# Patient Record
Sex: MALE | Race: BLACK OR AFRICAN AMERICAN | NOT HISPANIC OR LATINO | Employment: FULL TIME | URBAN - METROPOLITAN AREA
[De-identification: names, ages, dates, MRNs, and addresses within clinical notes are randomized per-mention and may not be internally consistent; named-entity substitution may affect disease eponyms.]

---

## 2020-01-14 ENCOUNTER — OFFICE VISIT (OUTPATIENT)
Dept: FAMILY MEDICINE CLINIC | Facility: CLINIC | Age: 50
End: 2020-01-14

## 2020-01-14 VITALS
HEIGHT: 67 IN | TEMPERATURE: 98 F | HEART RATE: 60 BPM | BODY MASS INDEX: 19.93 KG/M2 | OXYGEN SATURATION: 98 % | SYSTOLIC BLOOD PRESSURE: 122 MMHG | DIASTOLIC BLOOD PRESSURE: 80 MMHG | WEIGHT: 127 LBS

## 2020-01-14 DIAGNOSIS — J06.9 VIRAL UPPER RESPIRATORY TRACT INFECTION: Primary | ICD-10-CM

## 2020-01-14 PROCEDURE — 99213 OFFICE O/P EST LOW 20 MIN: CPT | Performed by: INTERNAL MEDICINE

## 2020-01-14 NOTE — PROGRESS NOTES
Subjective   Skyler Huerta is a 49 y.o. male.     Chief Complaint   Patient presents with   • Cough   • URI       History of Present Illness   2-3 weeks of cough with some phlegm and tight chest with no wheezing, short of breath, fever, chills, rashes, N, V or other issues. [facility] and tested negative for the flu.  Given cough med and prednisone.  Is some better but not completely.    The following portions of the patient's history were reviewed and updated as appropriate: allergies, current medications, past family history, past medical history, past social history, past surgical history and problem list.    Depression Screen:  No flowsheet data found.    Past Medical History:   Diagnosis Date   • Acute frontal sinusitis        History reviewed. No pertinent surgical history.    Family History   Problem Relation Age of Onset   • No Known Problems Mother    • No Known Problems Father        Social History     Socioeconomic History   • Marital status:      Spouse name: Not on file   • Number of children: Not on file   • Years of education: Not on file   • Highest education level: Not on file   Tobacco Use   • Smoking status: Never Smoker   • Smokeless tobacco: Never Used   Substance and Sexual Activity   • Alcohol use: Not Currently   • Drug use: Never   • Sexual activity: Defer       Current Outpatient Medications   Medication Sig Dispense Refill   • brompheniramine-pseudoephedrine-DM 30-2-10 MG/5ML syrup 10mL po q4-6h prn cough/congestion 180 mL 0     No current facility-administered medications for this visit.        Review of Systems   Constitutional: Negative for activity change, appetite change, fatigue, fever, unexpected weight gain and unexpected weight loss.   HENT: Negative for nosebleeds, rhinorrhea, trouble swallowing and voice change.    Eyes: Negative for visual disturbance.   Respiratory: Positive for cough. Negative for chest tightness, shortness of breath and  "wheezing.    Cardiovascular: Negative for chest pain, palpitations and leg swelling.   Gastrointestinal: Negative for abdominal pain, blood in stool, constipation, diarrhea, nausea, vomiting, GERD and indigestion.   Genitourinary: Negative for dysuria, frequency and hematuria.   Musculoskeletal: Negative for arthralgias, back pain and myalgias.   Skin: Negative for rash and bruise.   Neurological: Negative for dizziness, tremors, weakness, light-headedness, numbness, headache and memory problem.   Hematological: Negative for adenopathy. Does not bruise/bleed easily.   Psychiatric/Behavioral: Negative for sleep disturbance and depressed mood. The patient is not nervous/anxious.        Objective   /80 (BP Location: Left arm, Patient Position: Sitting, Cuff Size: Adult)   Pulse 60   Temp 98 °F (36.7 °C)   Ht 170.2 cm (67.01\")   Wt 57.6 kg (127 lb)   SpO2 98%   BMI 19.89 kg/m²     Physical Exam   Constitutional: He is oriented to person, place, and time. He appears well-developed and well-nourished. No distress.   HENT:   Head: Normocephalic and atraumatic.   Right Ear: External ear normal.   Left Ear: External ear normal.   Nose: Nose normal.   Mouth/Throat: Oropharynx is clear and moist.   Eyes: Pupils are equal, round, and reactive to light. Conjunctivae and EOM are normal.   Neck: Normal range of motion. Neck supple. No tracheal deviation present. No thyromegaly present.   Cardiovascular: Normal rate, regular rhythm, normal heart sounds and intact distal pulses. Exam reveals no gallop and no friction rub.   No murmur heard.  Pulmonary/Chest: Effort normal and breath sounds normal. No respiratory distress.   Abdominal: Soft. Bowel sounds are normal. He exhibits no mass. There is no tenderness. There is no guarding.   Musculoskeletal: Normal range of motion. He exhibits no edema.   Lymphadenopathy:     He has no cervical adenopathy.   Neurological: He is alert and oriented to person, place, and time. He " displays normal reflexes. He exhibits normal muscle tone.   Skin: Skin is warm and dry. Capillary refill takes less than 2 seconds. No rash noted. He is not diaphoretic.   Psychiatric: He has a normal mood and affect. His behavior is normal. Judgment and thought content normal.   Nursing note and vitals reviewed.      Recent Results (from the past 2016 hour(s))   POCT Influenza A/B    Collection Time: 12/27/19  9:38 AM   Result Value Ref Range    Rapid Influenza A Ag Negative Negative    Rapid Influenza B Ag Negative Negative    Internal Control Passed Passed    Lot Number [id number]     Expiration Date 06/30/2021      Assessment/Plan   Skyler was seen today for cough and uri.    Diagnoses and all orders for this visit:    Viral upper respiratory tract infection    Reviewed history discussed with patient.  He appears to have residual cough is postviral symptom.  Symmetric only at this time no antibiotics or other treatment is necessary.  Patient is to schedule for a annual physical in approximately 3 months at which time we will need to be scheduled for his colonoscopy and blood work.

## 2020-06-10 ENCOUNTER — OFFICE VISIT (OUTPATIENT)
Dept: FAMILY MEDICINE CLINIC | Facility: CLINIC | Age: 50
End: 2020-06-10

## 2020-06-10 VITALS
SYSTOLIC BLOOD PRESSURE: 132 MMHG | BODY MASS INDEX: 19.78 KG/M2 | OXYGEN SATURATION: 99 % | HEART RATE: 66 BPM | WEIGHT: 126 LBS | TEMPERATURE: 98.6 F | HEIGHT: 67 IN | DIASTOLIC BLOOD PRESSURE: 88 MMHG

## 2020-06-10 DIAGNOSIS — Z12.5 ENCOUNTER FOR SPECIAL SCREENING EXAMINATION FOR NEOPLASM OF PROSTATE: ICD-10-CM

## 2020-06-10 DIAGNOSIS — Z00.00 ANNUAL PHYSICAL EXAM: Primary | ICD-10-CM

## 2020-06-10 DIAGNOSIS — Z12.11 SCREENING FOR COLON CANCER: ICD-10-CM

## 2020-06-10 PROBLEM — N32.81 OAB (OVERACTIVE BLADDER): Status: ACTIVE | Noted: 2020-06-10

## 2020-06-10 PROBLEM — J06.9 URI (UPPER RESPIRATORY INFECTION): Status: RESOLVED | Noted: 2020-01-14 | Resolved: 2020-06-10

## 2020-06-10 PROCEDURE — 99396 PREV VISIT EST AGE 40-64: CPT | Performed by: INTERNAL MEDICINE

## 2020-06-10 NOTE — PROGRESS NOTES
Chief Complaint   Patient presents with   • Annual Exam   • latex allergy       HPI:  Skyler Huerta, -1970, is a 50 y.o. male who presents for an annual physical.    Recent Hospitalizations:  No hospitalization(s) within the last year..    Current Medical Providers:  Patient Care Team:  Provider, No Known as PCP - General    Compared to one year ago, the patient feels his physical health is the same and his mental health is the same.    Depression Screen:  PHQ-2/PHQ-9 Depression Screening 6/10/2020   Little interest or pleasure in doing things 0   Feeling down, depressed, or hopeless 0   Trouble falling or staying asleep, or sleeping too much 0   Feeling tired or having little energy 0   Poor appetite or overeating 0   Feeling bad about yourself - or that you are a failure or have let yourself or your family down 0   Trouble concentrating on things, such as reading the newspaper or watching television 0   Moving or speaking so slowly that other people could have noticed. Or the opposite - being so fidgety or restless that you have been moving around a lot more than usual 0   Thoughts that you would be better off dead, or of hurting yourself in some way 0   Total Score 0       Past Medical/Family/Social History:  The following portions of the patient's history were reviewed and updated as appropriate: allergies, current medications, past family history, past medical history, past social history, past surgical history and problem list.    No Known Allergies      Current Outpatient Medications:   •  brompheniramine-pseudoephedrine-DM 30-2-10 MG/5ML syrup, 10mL po q4-6h prn cough/congestion, Disp: 180 mL, Rfl: 0    Current medication list contains no high risk medications.  No harmful drug interactions have been identified.     Family History   Problem Relation Age of Onset   • No Known Problems Mother    • No Known Problems Father        Social History     Tobacco Use   • Smoking status: Never Smoker   •  "Smokeless tobacco: Never Used   Substance Use Topics   • Alcohol use: Not Currently       History reviewed. No pertinent surgical history.    Patient Active Problem List   Diagnosis   • Annual physical exam   • Screening for colon cancer   • Encounter for special screening examination for neoplasm of prostate   • OAB (overactive bladder)       Review of Systems   Constitutional: Negative for activity change, appetite change, fatigue, fever, unexpected weight gain and unexpected weight loss.   HENT: Negative for nosebleeds, rhinorrhea, trouble swallowing and voice change.    Eyes: Negative for visual disturbance.   Respiratory: Negative for cough, chest tightness, shortness of breath and wheezing.    Cardiovascular: Negative for chest pain, palpitations and leg swelling.   Gastrointestinal: Negative for abdominal pain, blood in stool, constipation, diarrhea, nausea, vomiting, GERD and indigestion.   Genitourinary: Negative for dysuria, frequency and hematuria.   Musculoskeletal: Negative for arthralgias, back pain and myalgias.   Skin: Negative for rash and bruise.   Neurological: Negative for dizziness, tremors, weakness, light-headedness, numbness, headache and memory problem.   Hematological: Negative for adenopathy. Does not bruise/bleed easily.   Psychiatric/Behavioral: Negative for sleep disturbance and depressed mood. The patient is not nervous/anxious.        Objective     Vitals:    06/10/20 0914   BP: 132/88   BP Location: Left arm   Patient Position: Sitting   Cuff Size: Adult   Pulse: 66   Temp: 98.6 °F (37 °C)   TempSrc: Temporal   SpO2: 99%   Weight: 57.2 kg (126 lb)   Height: 170.2 cm (67.01\")       Patient's Body mass index is 19.73 kg/m². BMI is within normal parameters. No follow-up required..      No exam data present    Physical Exam   Constitutional: He is oriented to person, place, and time. He appears well-developed and well-nourished. No distress.   HENT:   Head: Normocephalic and atraumatic. "   Right Ear: External ear normal.   Left Ear: External ear normal.   Nose: Nose normal.   Mouth/Throat: Oropharynx is clear and moist.   Eyes: Pupils are equal, round, and reactive to light. Conjunctivae and EOM are normal.   Neck: Normal range of motion. Neck supple. No tracheal deviation present. No thyromegaly present.   Cardiovascular: Normal rate, regular rhythm, normal heart sounds and intact distal pulses. Exam reveals no gallop and no friction rub.   No murmur heard.  Pulmonary/Chest: Effort normal and breath sounds normal. No respiratory distress.   Abdominal: Soft. Bowel sounds are normal. He exhibits no mass. There is no tenderness. There is no guarding.   Musculoskeletal: Normal range of motion. He exhibits no edema.   Lymphadenopathy:     He has no cervical adenopathy.   Neurological: He is alert and oriented to person, place, and time. He displays normal reflexes. He exhibits normal muscle tone.   Skin: Skin is warm and dry. Capillary refill takes less than 2 seconds. No rash noted. He is not diaphoretic.   Psychiatric: He has a normal mood and affect. His behavior is normal. Judgment and thought content normal.   Nursing note and vitals reviewed.      Recent Lab Results:          Assessment/Plan   Age-appropriate Screening Schedule:  Refer to the list below for future screening recommendations based on patient's age, sex and/or medical conditions.      Health Maintenance   Topic Date Due   • TDAP/TD VACCINES (1 - Tdap) 03/09/1981   • COLONOSCOPY  01/06/2020   • ZOSTER VACCINE (1 of 2) 03/09/2020   • INFLUENZA VACCINE  08/01/2020       Diagnoses and all orders for this visit:    1. Annual physical exam (Primary)  -     Comprehensive Metabolic Panel  -     Lipid Panel    2. Screening for colon cancer  -     Ambulatory Referral For Screening Colonoscopy    3. Encounter for special screening examination for neoplasm of prostate  -     PSA Screen        Annual wellness visit reviewed with patient.  All  past history, medications, social history, and problem list were reviewed.  Discussed advanced directives and living will.  Patient has living will: Living will: no and information packet given to patient to complete at home and bring copy to office.  Will check the labs as ordered above to evaluate the blood sugars, kidney, liver, cholesterol for screening.  Discussed flu shot recommended to get the high-dose influenza vaccine annually in the fall.  Encouraged follow-up with the eye doctor on annual basis.  Discussed weight and encouraged exercise as tolerated while following a healthy diet.  Reviewed sexual health and safe sex practices.  Colon cancer screening discussed and current status: colonoscopy to be scheduled.  Discussed prostate screening.    An After Visit Summary with all of these plans were given to the patient.        Follow Up:  No follow-ups on file.

## 2020-06-11 LAB
ALBUMIN SERPL-MCNC: 5 G/DL (ref 3.5–5.2)
ALBUMIN/GLOB SERPL: 1.7 G/DL
ALP SERPL-CCNC: 64 U/L (ref 39–117)
ALT SERPL-CCNC: 27 U/L (ref 1–41)
AST SERPL-CCNC: 19 U/L (ref 1–40)
BILIRUB SERPL-MCNC: 0.4 MG/DL (ref 0.2–1.2)
BUN SERPL-MCNC: 9 MG/DL (ref 6–20)
BUN/CREAT SERPL: 7.8 (ref 7–25)
CALCIUM SERPL-MCNC: 9.7 MG/DL (ref 8.6–10.5)
CHLORIDE SERPL-SCNC: 106 MMOL/L (ref 98–107)
CHOLEST SERPL-MCNC: 192 MG/DL (ref 0–200)
CO2 SERPL-SCNC: 25.6 MMOL/L (ref 22–29)
CREAT SERPL-MCNC: 1.15 MG/DL (ref 0.76–1.27)
GLOBULIN SER CALC-MCNC: 2.9 GM/DL
GLUCOSE SERPL-MCNC: 87 MG/DL (ref 65–99)
HDLC SERPL-MCNC: 72 MG/DL (ref 40–60)
LDLC SERPL CALC-MCNC: 111 MG/DL (ref 0–100)
POTASSIUM SERPL-SCNC: 4.5 MMOL/L (ref 3.5–5.2)
PROT SERPL-MCNC: 7.9 G/DL (ref 6–8.5)
PSA SERPL-MCNC: 1.27 NG/ML (ref 0–4)
SODIUM SERPL-SCNC: 141 MMOL/L (ref 136–145)
TRIGL SERPL-MCNC: 44 MG/DL (ref 0–150)
VLDLC SERPL CALC-MCNC: 8.8 MG/DL

## 2020-06-24 ENCOUNTER — OFFICE VISIT (OUTPATIENT)
Dept: FAMILY MEDICINE CLINIC | Facility: CLINIC | Age: 50
End: 2020-06-24

## 2020-06-24 VITALS
SYSTOLIC BLOOD PRESSURE: 132 MMHG | OXYGEN SATURATION: 98 % | HEART RATE: 78 BPM | TEMPERATURE: 98.9 F | WEIGHT: 125 LBS | DIASTOLIC BLOOD PRESSURE: 88 MMHG | HEIGHT: 67 IN | BODY MASS INDEX: 19.62 KG/M2

## 2020-06-24 DIAGNOSIS — M67.432 GANGLION CYST OF WRIST, LEFT: ICD-10-CM

## 2020-06-24 DIAGNOSIS — L23.5 CHEMICAL INDUCED ALLERGIC CONTACT DERMATITIS: Primary | ICD-10-CM

## 2020-06-24 PROCEDURE — 99214 OFFICE O/P EST MOD 30 MIN: CPT | Performed by: INTERNAL MEDICINE

## 2020-06-24 NOTE — PROGRESS NOTES
Subjective   Skyler Huerta is a 50 y.o. male.     Chief Complaint   Patient presents with   • Allergies     ears   • knot on wrist       History of Present Illness   Complains of rash around the ears that occurs only after using his beats ear phones.  No other rashes or difficulty.  Switches to ear buds and no issues.  Rash lasts for a week.  States has 8-9 months of knot on the back side of the left wrist that is tender at times.  Gets bigger when uses the hands frequently     The following portions of the patient's history were reviewed and updated as appropriate: allergies, current medications, past family history, past medical history, past social history, past surgical history and problem list.    Depression Screen:  PHQ-2/PHQ-9 Depression Screening 6/10/2020   Little interest or pleasure in doing things 0   Feeling down, depressed, or hopeless 0   Trouble falling or staying asleep, or sleeping too much 0   Feeling tired or having little energy 0   Poor appetite or overeating 0   Feeling bad about yourself - or that you are a failure or have let yourself or your family down 0   Trouble concentrating on things, such as reading the newspaper or watching television 0   Moving or speaking so slowly that other people could have noticed. Or the opposite - being so fidgety or restless that you have been moving around a lot more than usual 0   Thoughts that you would be better off dead, or of hurting yourself in some way 0   Total Score 0       Past Medical History:   Diagnosis Date   • Acute frontal sinusitis        History reviewed. No pertinent surgical history.    Family History   Problem Relation Age of Onset   • No Known Problems Mother    • No Known Problems Father        Social History     Socioeconomic History   • Marital status:      Spouse name: Not on file   • Number of children: Not on file   • Years of education: Not on file   • Highest education level: Not on file   Tobacco Use   • Smoking  "status: Never Smoker   • Smokeless tobacco: Never Used   Substance and Sexual Activity   • Alcohol use: Not Currently   • Drug use: Never   • Sexual activity: Defer       No current outpatient medications on file.     No current facility-administered medications for this visit.        Review of Systems   Constitutional: Negative for activity change, appetite change, fatigue, fever, unexpected weight gain and unexpected weight loss.   HENT: Negative for congestion, nosebleeds, rhinorrhea, sore throat, trouble swallowing and voice change.    Eyes: Negative for pain and visual disturbance.   Respiratory: Negative for cough, chest tightness, shortness of breath and wheezing.    Cardiovascular: Negative for chest pain, palpitations and leg swelling.   Gastrointestinal: Negative for abdominal pain, blood in stool, constipation, diarrhea, nausea, vomiting, GERD and indigestion.   Genitourinary: Negative for dysuria, frequency and hematuria.   Musculoskeletal: Negative for arthralgias, back pain and myalgias.        Back of left hand with a small knot that is tender times   Skin: Positive for rash. Negative for bruise.   Neurological: Negative for dizziness, tremors, weakness, light-headedness, numbness, headache and memory problem.   Hematological: Negative for adenopathy. Does not bruise/bleed easily.   Psychiatric/Behavioral: Negative for sleep disturbance and depressed mood. The patient is not nervous/anxious.        Objective   /88 (BP Location: Left arm, Patient Position: Sitting, Cuff Size: Adult)   Pulse 78   Temp 98.9 °F (37.2 °C) (Temporal)   Ht 170.2 cm (67.01\")   Wt 56.7 kg (125 lb)   SpO2 98%   BMI 19.57 kg/m²     Physical Exam   Constitutional: He is oriented to person, place, and time. He appears well-developed and well-nourished. No distress.   HENT:   Head: Normocephalic and atraumatic.   Right Ear: External ear normal.   Left Ear: External ear normal.   Nose: Nose normal.   Mouth/Throat: " Oropharynx is clear and moist.   Eyes: Pupils are equal, round, and reactive to light. Conjunctivae and EOM are normal.   Neck: Normal range of motion. Neck supple. No tracheal deviation present. No thyromegaly present.   Cardiovascular: Normal rate, regular rhythm, normal heart sounds and intact distal pulses. Exam reveals no gallop and no friction rub.   No murmur heard.  Pulmonary/Chest: Effort normal and breath sounds normal. No respiratory distress.   Abdominal: Soft. Bowel sounds are normal. He exhibits no mass. There is no tenderness. There is no guarding.   Musculoskeletal: Normal range of motion. He exhibits no edema.   Mildly tender ganglion cyst on the dorsum of the left wrist without any overlying erythema.   Lymphadenopathy:     He has no cervical adenopathy.   Neurological: He is alert and oriented to person, place, and time. He displays normal reflexes. He exhibits normal muscle tone.   Skin: Skin is warm and dry. Capillary refill takes less than 2 seconds. No rash noted. He is not diaphoretic.   No rash currently around the ears.   Psychiatric: He has a normal mood and affect. His behavior is normal. Judgment and thought content normal.   Nursing note and vitals reviewed.      Recent Results (from the past 2016 hour(s))   Comprehensive Metabolic Panel    Collection Time: 06/10/20 10:04 AM   Result Value Ref Range    Glucose 87 65 - 99 mg/dL    BUN 9 6 - 20 mg/dL    Creatinine 1.15 0.76 - 1.27 mg/dL    eGFR Non African Am 67 >60 mL/min/1.73    eGFR African Am 82 >60 mL/min/1.73    BUN/Creatinine Ratio 7.8 7.0 - 25.0    Sodium 141 136 - 145 mmol/L    Potassium 4.5 3.5 - 5.2 mmol/L    Chloride 106 98 - 107 mmol/L    Total CO2 25.6 22.0 - 29.0 mmol/L    Calcium 9.7 8.6 - 10.5 mg/dL    Total Protein 7.9 6.0 - 8.5 g/dL    Albumin 5.00 3.50 - 5.20 g/dL    Globulin 2.9 gm/dL    A/G Ratio 1.7 g/dL    Total Bilirubin 0.4 0.2 - 1.2 mg/dL    Alkaline Phosphatase 64 39 - 117 U/L    AST (SGOT) 19 1 - 40 U/L    ALT  (SGPT) 27 1 - 41 U/L   Lipid Panel    Collection Time: 06/10/20 10:04 AM   Result Value Ref Range    Total Cholesterol 192 0 - 200 mg/dL    Triglycerides 44 0 - 150 mg/dL    HDL Cholesterol 72 (H) 40 - 60 mg/dL    VLDL Cholesterol 8.8 mg/dL    LDL Cholesterol  111 (H) 0 - 100 mg/dL   PSA Screen    Collection Time: 06/10/20 10:04 AM   Result Value Ref Range    PSA 1.270 0.000 - 4.000 ng/mL     Assessment/Plan   Skyler was seen today for allergies and knot on wrist.    Diagnoses and all orders for this visit:    Chemical induced allergic contact dermatitis    Encounter for special screening examination for neoplasm of prostate  -     Ambulatory Referral to Hand Surgery    Discussed with patient at length concerning his rash which I believe is due to a chemical dermatitis likely from either a diet or component of his earphones that he has been using.  If he has recurrent flares and he is use hydrocortisone and symptomatic treatment.  I did recommend he stop using those specific headphones that he has been having a problem with.  Discussed his ganglion cyst of the left wrist dorsum.  Patient wishes to have further evaluation and treatment.  Will refer to hand surgery for continued care.

## 2020-06-30 ENCOUNTER — PREP FOR SURGERY (OUTPATIENT)
Dept: OTHER | Facility: HOSPITAL | Age: 50
End: 2020-06-30

## 2020-06-30 DIAGNOSIS — Z12.11 ENCOUNTER FOR SCREENING FOR MALIGNANT NEOPLASM OF COLON: Primary | ICD-10-CM

## 2020-07-23 PROBLEM — Z12.11 ENCOUNTER FOR SCREENING FOR MALIGNANT NEOPLASM OF COLON: Status: ACTIVE | Noted: 2020-07-23

## 2021-03-30 ENCOUNTER — BULK ORDERING (OUTPATIENT)
Dept: CASE MANAGEMENT | Facility: OTHER | Age: 51
End: 2021-03-30

## 2021-03-30 DIAGNOSIS — Z23 IMMUNIZATION DUE: ICD-10-CM

## 2021-04-05 ENCOUNTER — IMMUNIZATION (OUTPATIENT)
Dept: VACCINE CLINIC | Facility: HOSPITAL | Age: 51
End: 2021-04-05

## 2021-04-05 DIAGNOSIS — Z23 IMMUNIZATION DUE: ICD-10-CM

## 2021-04-05 PROCEDURE — 0001A: CPT | Performed by: INTERNAL MEDICINE

## 2021-04-05 PROCEDURE — 91300 HC SARSCOV02 VAC 30MCG/0.3ML IM: CPT | Performed by: INTERNAL MEDICINE

## 2021-04-13 ENCOUNTER — TELEPHONE (OUTPATIENT)
Dept: GASTROENTEROLOGY | Facility: CLINIC | Age: 51
End: 2021-04-13

## 2021-04-13 NOTE — TELEPHONE ENCOUNTER
----- Message from Amber García sent at 4/12/2021 12:11 PM EDT -----  Regarding: Schedule scope  Contact: [phone]  PT needs to schedule scope

## 2021-04-26 ENCOUNTER — IMMUNIZATION (OUTPATIENT)
Dept: VACCINE CLINIC | Facility: HOSPITAL | Age: 51
End: 2021-04-26

## 2021-04-26 PROCEDURE — 91300 HC SARSCOV02 VAC 30MCG/0.3ML IM: CPT | Performed by: INTERNAL MEDICINE

## 2021-04-26 PROCEDURE — 0002A: CPT | Performed by: INTERNAL MEDICINE

## 2021-07-09 ENCOUNTER — OUTSIDE FACILITY SERVICE (OUTPATIENT)
Dept: GASTROENTEROLOGY | Facility: CLINIC | Age: 51
End: 2021-07-09

## 2021-07-09 PROCEDURE — 45385 COLONOSCOPY W/LESION REMOVAL: CPT | Performed by: INTERNAL MEDICINE

## 2021-07-28 ENCOUNTER — TELEPHONE (OUTPATIENT)
Dept: GASTROENTEROLOGY | Facility: CLINIC | Age: 51
End: 2021-07-28

## 2021-07-28 NOTE — TELEPHONE ENCOUNTER
----- Message from Kiley Craft DO sent at 1/27/2020  8:54 AM CST -----  Urine showed no signs of infection  Kiley Craft DO     ----- Message from Silvia Crook MD sent at 7/14/2021 12:19 PM EDT -----  The polyp(s) biopsies showed adenomatous change. This is not cancerous but is considered potentially precancerous. Follow-up colonoscopy in 5 years is advised.

## 2021-12-18 ENCOUNTER — IMMUNIZATION (OUTPATIENT)
Dept: VACCINE CLINIC | Facility: HOSPITAL | Age: 51
End: 2021-12-18

## 2021-12-18 PROCEDURE — 91300 HC SARSCOV02 VAC 30MCG/0.3ML IM: CPT | Performed by: INTERNAL MEDICINE

## 2021-12-18 PROCEDURE — 0004A HC ADM SARSCOV2 30MCG/0.3ML BOOSTER: CPT | Performed by: INTERNAL MEDICINE

## 2022-12-08 ENCOUNTER — E-VISIT (OUTPATIENT)
Dept: FAMILY MEDICINE CLINIC | Facility: TELEHEALTH | Age: 52
End: 2022-12-08
Payer: COMMERCIAL

## 2022-12-08 PROCEDURE — BRIGHTMDVISIT: Performed by: NURSE PRACTITIONER

## 2022-12-08 NOTE — E-VISIT TREATED
Chief Complaint: Coronavirus (COVID-19), cold, sinus pain, allergy, or flu   Patient introduction   Patient is 52-year-old male with cough, fever (which may have resolved; see below), nasal discharge, itchy or watery eyes, headache, sweats, chills, myalgia, and fatigue that started less than 48 hours ago. Regarding date of symptom onset, patient writes: [id number].   COVID-19 exposure, testing history, and vaccination status:    No known exposure to a person with a confirmed or suspected case of COVID-19.    No recent travel outside of their local community.    No history of COVID-19 testing since symptom onset.    Received 4 doses of the COVID-19 vaccine (Pfizer, Pfizer, Pfizer, Pfizer).   Received their most recent dose of the vaccine more than 14 days ago.   Patient requests a 2-day excuse note.   General presentation   Symptoms came on gradually.   Fever:    Has had less than 24 hours of measured fever.    Has current measured fever, but none at initial symptom onset.    Highest temperature of 101.6F to 101.9F.   Sinus and nasal symptoms:    Nasal discharge.    Clear nasal drainage.    Nasal drainage is thin.    Postnasal drip.    No nasal or sinus congestion.    No itchy nose or sneezing.   Throat symptoms:    No sore throat.   Head and body aches:    Headache described as mild (1 to 3 on a scale of 1 to 10).    Sweats.    Chills.    Myalgia.    Fatigue.   Cough:    Cough is worse in the morning.    Cough is mildly productive of sputum.    Describes color of sputum as clear.   Wheezing and shortness of breath:    No COPD diagnosis.    No asthma diagnosis.    No shortness of breath.    No previous albuterol inhaler use for URIs, bronchitis, or pneumonia.    No previous steroid inhaler use for URIs, bronchitis, or pneumonia.   Chest pain:    Has chest pain, but only when coughing.    Chest pain is not the patient's chief complaint.    [place] Heart Score (MHS): 0, low risk of CAD. Assigning 1 point to each of 5  criteria (female >= 65 years old or male >= 55 years, known CAD, pain worse with exercise, pain not reproducible with palpation, and patient assumes pain is cardiogenic), the MHS is a validated clinical decision rule used to rule out coronary artery disease in primary care patients with chest pain.   Ear symptoms:    None.   Dizziness:    Mild dizziness that does not interfere with daily activities.   Allergies:    No history of allergies.   Flu exposure:    No recent known exposure to a person with a confirmed flu diagnosis.    Has not had a flu vaccine this season.   Patient is taking over-the-counter medications for current symptoms, including acetaminophen, ibuprofen, and loratadine.   Review of red flags/alarm symptoms:    No changes in alertness or awareness.    No symptoms suggesting intracranial hemorrhage.    No fever above 100.4F lasting longer than 4 days.    No decreased urination.   Self-exam:    Height: 170 centimeters    Weight: 58.9 kilograms    No difficulty moving their chin toward their chest.    Neck lymph nodes feel normal.    No periorbital edema.    Has not taken antibiotics for similar symptoms within the past month.   Current medications   Not taking other medications or supplements.   Medication allergies   None.   Medication contraindication review   No history of anaphylactic reaction to beta-lactam antibiotics; aspirin triad; blood dyscrasia; bone marrow depression; catecholamine-releasing paraganglioma; coronary artery disease; coagulation disorder; congenital long QT syndrome; depression; electrolyte abnormalities; fungal infection; GI bleeding; GI obstruction; G6PD deficiency; heart arrhythmia; hypertension; mononucleosis; myasthenia; recent myocardial infarction; NSAID-induced asthma/urticaria; Parkinson's disease; pheochromocytoma; porphyria; Reye syndrome; seizure disorder; ulcerative colitis; and urinary retention.   No history of metoclopramide-associated dystonic reaction and  tardive dyskinesia.   No known history of amoxicillin-clavulanate-associated cholestatic jaundice or hepatic impairment.   No known history of azithromycin-associated cholestatic jaundice or hepatic impairment.   Past medical history   Immune conditions: No immunocompromising conditions. No history of cancer.   Social history   High-risk household contacts: Patient's household includes one or more persons with asthma.   Never smoked tobacco.   Assessment   Viral URI, cannot rule out influenza or COVID-19.   Plan   Medications:    benzonatate 200 mg capsule RX 200mg 1 cap PO tid PRN 7d for cough. Do not chew or cut these capsules. Amount is 21 cap.    Mucus Relief  mg tablet, extended release RX 600mg 1 tab PO q12h PRN 7d for cough and congestion. Amount is 14 tab.    ipratropium bromide 42 mcg (0.06 %) nasal spray RX 0.06% 2 sprays nasal tid PRN 4d for nasal symptoms. Amount is 15 mL.    oseltamivir 75 mg capsule RX 75mg 1 cap PO bid 5d for influenza (flu). Take it exactly as directed. You must finish the entire course of medication, even if you feel better after the first few days of treatment. Amount is 10 cap.    Theraflu Nighttime Severe Cold-Cough 25 mg-10 mg-650 mg powder packet OTC Phenylephrine hydrochloride 10mg/Diphenhydramine hydrochloride 25mg/Acetaminophen 650mg 1 packet PO q4h PRN 7d for cold symptoms. Consume entire drink within 10-15 minutes. Do not exceed 5 packets in any 24-hour period. May cause drowsiness. Do not drive or operate heavy machinery after taking this medication. Do not combine with other products that contain acetaminophen. Amount is 6 packet.    ibuprofen 200 mg tablet OTC 200mg 2 tabs PO q8h PRN 10d for any fever, pain, or discomfort associated with your condition. Do not exceed 3200mg in a 24-hour period. Amount is 60 tab.   The patient's prescriptions will be sent to:   [facility] [id number]   [address] [place], [place] [id number]   Phone: [phone]     Fax:  [phone]   Patient informed to purchase OTC medications.   Other:   Patient was given an excuse note for 2 days.   Education:    Condition and causes    Prevention    Treatment and self-care    When to call provider   ----------   Electronically signed by DM Davis on 2022-12-08 at 15:33PM   ----------   Patient Interview Transcript:   Please carefully consider each question and answer as best you can. This helps your provider give you the best care. Which of these symptoms are bothering you? Select all that apply.    Cough    Fever    Runny nose    Itchy or watery eyes    Headache    Sweats    Chills    Muscle or body aches    Fatigue or tiredness   Not selected:    Shortness of breath    Stuffed-up nose or sinuses    Itchy nose or sneezing    Loss of smell or taste    Sore throat    Hoarse voice or loss of voice    Nausea or vomiting    Diarrhea    I don't have any of these symptoms   Since your current symptoms started, have you been tested for COVID-19? Select one.    No   Not selected:    Yes   Have you gotten the COVID-19 vaccine? Select one.    Yes   Not selected:    No   How many total doses of the COVID-19 vaccine have you gotten? This includes boosters as well as additional doses for those who are immunocompromised. Select one.    4 doses   Not selected:    1 dose    2 doses    3 doses    5 doses   1st dose    Pfizer   Not selected:    J&J/Neris    Moderna    Novavax   2nd dose    Pfizer   Not selected:    J&J/Neris    Moderna    Novavax   3rd dose    Pfizer   Not selected:    J&J/Neris    Moderna    Novavax   4th dose    Pfizer   Not selected:    J&J/Neris    Moderna    Novavax   When did you get your most recent dose of the COVID-19 vaccine?    More than 14 days ago   Not selected:    Less than 48 hours (2 days) ago    48 to 72 hours (3 days) ago    3 to 5 days ago    5 to 7 days ago    7 to 14 days ago   In the last 14 days, have you traveled outside of your local community? This  "includes travel by car, RV, bus, train, or plane. Travel increases your chances of getting and spreading COVID-19. Select one.    No   Not selected:    Yes   In the last 14 days, have you had close contact with someone who has coronavirus (COVID-19)? \"Close contact\" means any of these: - Living in the same household as someone with COVID-19. - Caring for someone with COVID-19. - Being within 6 feet of someone with COVID-19 for a total of at least 15 minutes over a 24-hour period. For example, three 5-minute exposures for a total of 15 minutes. - Being in direct contact with respiratory droplets from someone with COVID-19 (being coughed on, kissing, sharing utensils). Select one.    No, not that I know of   Not selected:    Yes, a confirmed case    Yes, a suspected case   When did your current symptoms start? Select one.    Less than 48 hours ago   Not selected:    3 to 5 days ago    6 to 9 days ago    10 to 14 days ago    2 to 3 weeks ago    3 to 4 weeks ago    More than a month ago   Do you know the exact date your symptoms started? If so, enter the date as MM/DD/YY. Select one.    Yes (specify): [id number]   Not selected:    No   Did your symptoms come on suddenly or gradually? Select one.    Gradually   Not selected:    Suddenly    I'm not sure   You mentioned having a fever. Do you have a fever now? Select one.    Yes, but I didn't have one when my symptoms started   Not selected:    Yes, and I've had one since my symptoms started    No, it's gone now   Did you take your temperature with a thermometer? Select one.    Yes   Not selected:    No, but it felt mild    No, but it felt high   What was the highest reading on the thermometer? Select one.    101.6 to 101.9F   Not selected:    Below 100.4F    100.4 to 101.5F    102.0 to 103.0F    Above 103.0F   How long have you had a fever? Select one.    Less than 24 hours   Not selected:    1 to 3 days    4 or more days   You mentioned having a headache. On a scale of 1 to " "10, how severe is your headache pain? Select one.    Mild (1 to 3)   Not selected:    Moderate (4 to 6)    Severe (7 to 9)    Unbearable (10)    The worst headache of my life (10+)   Do you cough so hard that it's made you gag or vomit? By gag, we mean has your coughing made you choke or dry heave? Select all that apply.    No   Not selected:    Yes, my coughing has made me gag    Yes, my coughing has made me vomit   When is your cough the worst? Select all that apply.    In the morning, or when I wake up   Not selected:    During the day    At nighttime, or while I'm sleeping    I haven't noticed a difference depending on time of day   Are you coughing up mucus or phlegm? Select one.    Yes, a little   Not selected:    No, my cough is dry    Yes, a lot   What color is most of the mucus or phlegm that you're coughing up? Select one.    Clear   Not selected:    White/frothy    Yellow    Green    Red or pink    I'm not sure   What color is your nasal drainage? Select one.    Clear   Not selected:    White    Yellow    Green    My nose is stuffed but not draining or running    I'm not sure   Is your nasal drainage thick or thin? Select one.    Thin   Not selected:    Thick   Is there any drainage (mucus) going down the back of your throat? This kind of drainage is also called \"postnasal drip.\" Select one.    Yes   Not selected:    No, not that I know of   Since your symptoms started, have you felt dizzy? Select one.    Yes, but I can continue with my regular daily activities   Not selected:    Yes, and it makes it hard to stand, walk, or do daily activities    No   Do you have chest pain? You might also feel it as discomfort, aching, tightness, or squeezing in the chest. Select one.    Yes   Not selected:    No   Which of these is true of your chest pain? Select one.    My chest hurts only when I cough   Not selected:    My chest hurts even when I'm not coughing   Have you urinated at least 3 times in the last 24 " hours? Select one.    Yes   Not selected:    No   Changes in alertness or awareness may mean you need emergency care. Since your symptoms started, have you had any of these? Select all that apply.    None of the above   Not selected:    Confusion    Slurred speech    Not knowing where you are or what day it is    Difficulty staying conscious    Fainting or passing out   Do your symptoms include a whistling sound, or wheezing, when you breathe? Select one.    I'm not sure   Not selected:    Yes    No   Are your eyelids or the areas around your eyes puffy? Select one.    No   Not selected:    Yes, but I can easily open my eye(s)    Yes, and it's hard to open my eye(s)    Yes, and my eye(s) are completely swollen shut   Do you have any of these symptoms in your ear(s)? Select all that apply.    None of the above   Not selected:    Pain    Pressure    Fullness    Crackling or popping    Plugged or blocked sensation   Can you move your chin toward your chest?    Yes   Not selected:    No, my neck is too stiff   Are your glands/lymph nodes swollen, or does it hurt when you touch them?    No, not that I can tell   Not selected:    Yes   In the past week, has anyone around you (such as at school, work, or home) had a confirmed diagnosis of the flu? A confirmed diagnosis means that a nose swab was done to verify a flu infection. Select all that apply.    No, not that I know of   Not selected:    I live with someone who has the flu    I've been within touching distance of someone who has the flu    I've walked by, or sat about 3 feet away from, someone who has the flu    I've been in the same building as someone who has the flu   Have you ever been diagnosed with asthma? Select one.    No   Not selected:    Yes   Have you ever been prescribed albuterol to use for wheezing, cough, or shortness of breath caused by a cold, bronchitis, or pneumonia? Albuterol (ProAir, Proventil, Ventolin) is prescribed as an inhaler or a solution  to be used with a nebulizer machine. Select one.    No, not that I know of   Not selected:    Yes   Have you ever been prescribed a steroid inhaler to use for wheezing, cough, or shortness of breath caused by a cold, bronchitis, or pneumonia? Some examples of steroid inhalers include Pulmicort, Flovent, Qvar, and Alvesco. Select one.    No, not that I know of   Not selected:    Yes   Have you ever been diagnosed with chronic obstructive pulmonary disease (COPD)? Select one.    No, not that I know of   Not selected:    Yes   In the past month, have you taken antibiotics for similar symptoms? Examples of antibiotics include amoxicillin, amoxicillin-clavulanate (Augmentin), penicillin, cefdinir (Omnicef), doxycycline, and clindamycin (Cleocin). Select one.    No   Not selected:    Yes   Do you have allergies (pollen, dust mites, mold, animal dander)? Select one.    No, not that I know of   Not selected:    Yes   Have you had a flu shot this season? Select one.    No, not that I know of   Not selected:    Yes, less than 2 weeks ago    Yes, 2 to 4 weeks ago    Yes, 1 to 3 months ago    Yes, 3 to 6 months ago    Yes, more than 6 months ago   The flu and COVID-19 can be more serious for people with certain conditions or characteristics. These questions help us figure out if you or anyone you live with is at higher risk for complications from these infections. Do either of these statements apply to you? Select all that apply.    None of the above   Not selected:    I'm  or Native [place]    I'm a healthcare worker   Do you smoke tobacco? Select one.    No   Not selected:    Yes, every day    Yes, some days    No, I quit   Do you have any of these conditions? Select all that apply.    None of the above   Not selected:    Chronic lung disease, such as cystic fibrosis or interstitial fibrosis    Heart disease, such as congenital heart disease, congestive heart failure, or coronary artery disease    Disorder of  the brain, spinal cord, or nerves and muscles, such as dementia, cerebral palsy, epilepsy, muscular dystrophy, or developmental delay    Metabolic disorder or mitochondrial disease    Cerebrovascular disease, such as stroke or another condition affecting the blood vessels or blood supply to the brain    Down syndrome    Mood disorder, including depression or schizophrenia spectrum disorders    Substance use disorder, such as alcohol, opioid, or cocaine use disorder    Tuberculosis   Do you live in a group care setting? Examples include: - Nursing home - Residential care - Psychiatric treatment facility - [facility] - [facility] - [facility] and [facility] - Homeless shelter - Foster care setting Select one.    No   Not selected:    Yes   Are you a healthcare worker? Select one.    No   Not selected:    Yes   People with a very high body mass index (BMI) are at higher risk for developing complications from the flu and severe illness from COVID-19. To determine your BMI, we need to know your weight and height. Please enter your weight (in pounds).    Weight   Please enter your height.    Height   Do you have any of these conditions that can affect the immune system? Scroll to see all options. Select all that apply.    None of these   Not selected:    History of bone marrow transplant    Chronic kidney disease    Chronic liver disease (including cirrhosis)    HIV/AIDS    Inflammatory bowel disease (Crohn's disease or ulcerative colitis)    Lupus    Moderate to severe plaque psoriasis    Multiple sclerosis    Rheumatoid arthritis    Sickle cell anemia    Alpha or beta thalassemia    History of solid organ transplant (kidney, liver, or heart)    History of spleen removal    An autoimmune disorder not listed here    A condition requiring treatment with long-term use of oral steroids (such as prednisone, prednisolone, or dexamethasone)   Have you ever been diagnosed with cancer? Select one.    No   Not selected:    Yes, I have  cancer now    Yes, but I'm in remission   Do any of these apply to you? Select all that apply.    None of the above   Not selected:    I've been hospitalized within the last 5 days    I have diabetes    I'm in close contact with a child in    Do any of these apply to the people who live with you? Select all that apply.    None of the above   Not selected:    A child under the age of 5    An adult 65 or older    A person who is pregnant    A person who has given birth, had a miscarriage, had a pregnancy loss, or had an  in the last 2 weeks    An  or Native [place]   Does any member of your household have any of these medical conditions? Select all that apply.    Asthma   Not selected:    Disorders of the brain, spinal cord, or nerves and muscles, such as dementia, cerebral palsy, epilepsy, muscular dystrophy, or developmental delay    Chronic lung disease, such as COPD or cystic fibrosis    Heart disease, such as congenital heart disease, congestive heart failure, or coronary artery disease    Cerebrovascular disease, such as stroke or another condition affecting the blood vessels or blood supply to the brain    Blood disorders, such as sickle cell disease    Diabetes    Metabolic disorders such as inherited metabolic disorders or mitochondrial disease    Kidney disorders    Liver disorders    Weakened immune system due to illness or medications such as chemotherapy or steroids    Children under the age of 19 who are on long-term aspirin therapy    Extreme obesity (BMI > 40)    None of the above   Do you have any of these conditions? Scroll to see all options. Select all that apply.    None of the above   Not selected:    Aspirin triad (also known as Samter's triad or ASA triad)    Asthma or hives from taking aspirin or other NSAIDs, such as ibuprofen or naproxen    Blockage or narrowing of the blood vessels of the heart    Blood dyscrasia, such anemia, leukemia, lymphoma, or myeloma     Bone marrow depression    Catecholamine-releasing paraganglioma    Blood clotting disorder    Congenital long QT syndrome    Depression    Difficulty urinating or completely emptying your bladder    Uncorrected electrolyte abnormalities    Fungal infection    Gastrointestinal (GI) bleeding    Gastrointestinal (GI) obstruction    G6PD deficiency    Recent heart attack    High blood pressure    Irregular heartbeat or heart rhythm    Mononucleosis (mono)    Myasthenia gravis    Parkinson's disease    Pheochromocytoma    Reye syndrome    Seizure disorder    Ulcerative colitis   Have you ever had either of these conditions? Select all that apply.    No   Not selected:    Metoclopramide-associated dystonic reaction    Tardive dyskinesia   Just a few more questions about medications, and then you're finished. Have you used any non-prescription medications or nasal sprays for your current symptoms? Examples include saline sprays, decongestants, NyQuil, and Tylenol. Select one.    Yes   Not selected:    No   Which of these non-prescription medications have you tried? Scroll to see all options. Select all that apply.    Acetaminophen (Tylenol)    Ibuprofen (Advil, Motrin, Midol)    Loratadine (Alavert, Claritin)   Not selected:    Budesonide (Rhinocort)    Cetirizine (Zyrtec)    Chlorpheniramine (Aller-chlor, Chlor-Trimeton)    Cromolyn (NasalCrom)    Dextromethorphan (Delsym, Robitussin, Vicks DayQuil Cough)    Diphenhydramine (Benadryl)    Fexofenadine (Allegra)    Fluticasone (Flonase)    Guaifenesin (Mucinex)    Guaifenesin/dextromethorphan (Delsym DM, Mucinex DM, Robitussin DM)    Ketotifen (Alaway, Zaditor)    Naphazoline-pheniramine (Naphcon-A, Opcon-A, Visine-A)    Omeprazole (Prilosec)    Oxymetazoline (Afrin)    Phenylephrine (Sudafed)    Triamcinolone (Nasacort)    None of the above   Have you taken any monoamine oxidase inhibitor (MAOI) medications in the last 14 days? Examples include rasagiline (Azilect),  selegiline (Eldepryl, Zelapar), isocarboxazid (Marplan), phenelzine (Nardil), and tranylcypromine (Parnate). Select one.    No, not that I know of   Not selected:    Yes   Do you take Kynmobi or Apokyn (apomorphine)? Select one.    No   Not selected:    Yes   Are you taking any other medications, vitamins, or supplements? Select one.    No   Not selected:    Yes   Have you ever had an allergic or bad reaction to any medication? Select one.    No   Not selected:    Yes   Are you allergic to milk or to the proteins found in milk (for example, whey or casein)? A milk allergy is different from lactose intolerance. Select one.    No, not that I know of   Not selected:    Yes   Have you ever had jaundice or liver problems as a result of taking amoxicillin-clavulanate (Augmentin)? Jaundice is a condition in which the skin and the whites of the eyes turn yellow. Select all that apply.    No, not that I know of   Not selected:    Yes, jaundice    Yes, liver problems   Have you ever had jaundice or liver problems as a result of taking azithromycin (Zithromax, Zmax)? Jaundice is a condition in which the skin and the whites of the eyes turn yellow. Select all that apply.    No, not that I know of   Not selected:    Yes, jaundice    Yes, liver problems   Do you need a doctor's note? A doctor's note confirms that you received care today and states when you can return to school or work. It does not contain information about your diagnosis or treatment plan. Your provider will make the final decision on whether to give you a doctor's note and for how long. Doctor's notes CANNOT be backdated. We can't provide medical leave paperwork through this type of visit. If more paperwork is needed to request time off, contact your primary care provider. Select one.    Today and tomorrow (2 days)   Not selected:    Today only (1 day)    3 days    5 days    7 days    10 days    14 days    No   Is there anything else you'd like to tell us about  your symptoms?   The patient did not enter any additional information.   ----------   Medical history   The following information was received from the EMR on December 08, 2022.   Allergies:    No Known Allergies   - Allergy Type:   - Reaction:   - Severity:   - Clinical Status: Active   - Verification Status: Confirmed   Problem list:    URI (upper respiratory infection)   - Category: Problem List Item   - Health Status:   - Start Date: January 14, 2020   - End Date: Umu 10, 2020   - Status: Resolved    Annual physical exam   - Category: Problem List Item   - Health Status:   - Start Date: Umu 10, 2020   - End Date: None   - Status: Active    Screening for colon cancer   - Category: Problem List Item   - Health Status:   - Start Date: Umu 10, 2020   - End Date: None   - Status: Active    Encounter for special screening examination for neoplasm of prostate   - Category: Problem List Item   - Health Status:   - Start Date: Umu 10, 2020   - End Date: None   - Status: Active    OAB (overactive bladder)   - Category: Problem List Item   - Health Status:   - Start Date: Umu 10, 2020   - End Date: None   - Status: Active    Ganglion cyst of wrist, left   - Category: Problem List Item   - Health Status:   - Start Date: June 24, 2020   - End Date: None   - Status: Active    Chemical induced allergic contact dermatitis   - Category: Problem List Item   - Health Status:   - Start Date: June 24, 2020   - End Date: None   - Status: Active    Encounter for screening for malignant neoplasm of colon   - Category: Problem List Item   - Health Status:   - Start Date: July 23, 2020   - End Date: None   - Status: Active

## 2022-12-08 NOTE — EXTERNAL PATIENT INSTRUCTIONS
View Doctor's Note     Diagnosis   Viral upper respiratory infection (URI)   My name is Macarena Myles, and I'm a healthcare provider at [facility]. I've reviewed your interview and based on your responses, I see that you have a viral upper respiratory infection. However, the exact type of virus causing your illness isn't clear.   The start of cold and flu season, coupled with the ongoing COVID-19 pandemic, makes it hard to tell the difference between the common cold, the flu, or a COVID-19 infection. These illnesses share many of the same symptoms, including fever, fatigue, muscle and/or body aches, sore throat, runny/stuffy nose, and cough.   Most people with any of these illnesses have mild to moderate symptoms and can rest at home until they get better.   I've given you a doctor's note for 2 days.   I haven't prescribed any antibiotics. Antibiotics fight bacteria, not viruses. They don't help when you have a viral infection like the common cold, the flu, or a COVID-19 infection. Antibiotics could even make you feel worse as they can cause or worsen nausea, diarrhea, and stomach pain.    Stay home   While you're recovering, STAY HOME. Do not leave your home except to get medical care.   While at home, avoid close contact with others.   If possible, stay in a room away from other people in your home, and use a separate bathroom.   Wear a face mask when you can't avoid contact with other people.   If you can't wear a face mask because of breathing difficulty, your caregiver should wear a face mask.   Wearing a face mask does NOT mean you can leave your home. You must continue to stay home until you have recovered.   You can return to your normal activities when ALL of the following are true:    You've been fever-free for at least 24 hours (1 full day) without using fever-reducing medications such as Tylenol    Your symptoms have improved    It's been at least 5 full days since your symptoms first started    "Prevention    Cover your mouth and nose with a tissue when you cough or sneeze. Throw used tissues in a lined trash can right away, and wash your hands immediately after.    Avoid sharing personal items like dishes, utensils, towels, or bedding. Wash items thoroughly with soap and water after use.    Wash your hands often with soap and water for at least 20 seconds. If soap and water are not available, clean your hands with a hand  that contains at least 60% alcohol. Cover all surfaces of your hands and rub them together until they feel dry.    Avoid touching your face, especially their eyes, nose, and mouth.    Clean \"high-touch\" surfaces daily. \"High-touch\" surfaces include counters, tabletops, doorknobs, bathroom fixtures, toilets, phones, keyboards, tablets, and bedside tables. You can use soap, detergents, 60%-80% ethanol or isopropyl alcohol,  such as Windex, or bleach. All of these  are effective at killing the virus that causes COVID-19.    Limit contact with pets and other animals while sick. If you must care for your pet, wash your hands before and after you interact with them and wear a face mask.   What to expect   Follow the advice in the treatment section below and you should feel better within 7 to 14 days. You may continue to feel tired and have a cough for several weeks.   Medications   Your pharmacy   [facility] [id number] [place] [id number] [phone]     Prescription   Benzonatate (200mg): Take 1 capsule by mouth 3 times a day as needed for cough. Do not chew or cut these capsules.   Mucus Relief ER oral tablet, extended release (600mg): Take 1 tablet by mouth every 12 hours as needed for cough and congestion.   Ipratropium bromide nasal spray (0.03%): Spray 2 sprays in each nostril 3 times a day as needed for nasal symptoms.   Oseltamivir (75mg): Take 1 capsule by mouth twice a day for 5 days for influenza (flu). Take it exactly as " directed. You must finish the entire course of medication, even if you feel better after the first few days of treatment.    Because your symptoms are most similar to those commonly seen with the flu, I've prescribed an antiviral medication for you. Start taking this antiviral medication as soon as possible. The medication works best when started within 48 hours of getting sick. It won't get rid of your symptoms, but it will lessen the severity of your symptoms, shorten your illness by 1 to 2 days, and prevent serious complications.    The most common side effects of Tamiflu are nausea and vomiting. To lessen these side effects, take the medication with food. Tamiflu may rarely cause more serious side effects, such as behavior changes and delirium. If you have these serious side effects, stop taking the medication and speak to a provider immediately.   Non-prescription   Theraflu Nighttime Severe Cold-Cough (10mg/25mg/650mg): Drink 1 packet dissolved in 8 ounces hot water every 4 hours as needed for cold symptoms. Consume entire drink within 10 to 15 minutes. Do not exceed 5 packets in any 24-hour period. May cause drowsiness. Do not drive or operate heavy machinery after taking this medication. Do not combine with other products that contain acetaminophen.   Ibuprofen (200mg): Take 2 tablets by mouth every 8 hours as needed for up to 10 days for any fever, pain, or discomfort associated with your condition. Do not exceed 3200mg in a 24-hour period.   The medications recommended here can help ease your symptoms while your immune system fights the virus.   About your diagnosis   The common cold, the flu (influenza), and COVID-19 are respiratory illnesses that are caused by viruses. While the specific type of virus that causes these infections is different, the symptoms can often be similar. Fortunately, most people who get these infections have mild symptoms and can rest at home until they get better. For elderly  people and those with chronic medical problems, these infections can be serious or life-threatening.   When to seek care   Call us at [phone]   with any sudden or unexpected symptoms.   Call your healthcare provider immediately if you have any of the following:    Fever over 103F    Fever that doesn't come down when you take Tylenol or ibuprofen    Fever that returns after being gone for more than 24 hours    Fever for more than 4 days    Worsening shortness of breath or difficulty breathing   Go to your nearest ER or call 911 if you have any of the following:    Shortness of breath that makes it difficult to do simple things like get dressed, bathe, or comb your hair    Persistent chest pain or chest tightness    New confusion or difficulty staying alert    Bluish color to the lips or face   Other treatment    Rest and drink plenty of sugar-free fluids.    Gargle with salt water several times a day to help your throat feel better. Cough drops and throat lozenges may provide extra relief. A teaspoon of honey stirred into warm water or weak tea can help soothe a sore throat and cough.    If your nose or sinuses become very stuffy, try using a Neti Pot to flush them out. Neti Pots are available at any drugstore without a prescription.    Avoid smoke and air pollution. Smoke can make infections worse.    Coronavirus (COVID-19) information   Common symptoms of COVID-19 include fever, cough, shortness of breath, fatigue, muscle or body aches, headaches, new loss of sense of taste or smell, sore throat, stuffy or runny nose, nausea or vomiting, and diarrhea. Most people who get COVID-19 have mild symptoms and can rest at home until they get better. Elderly people and those with chronic medical problems may be at risk for more serious complications.   FAQs about the COVID-19 vaccine   There are four authorized COVID-19 vaccines: Dave & Dave's Neris Vaccine (J&J/Neris), Moderna, Novavax, and  Pfizer-BioNTech (The Resumator). The J&J/Neris and Novavax vaccines are approved for use in people aged 18 and older. The Moderna and Pfizer vaccines are approved for those aged 6 months and older. All four are available at no cost. Even if you don't have health insurance, you can still get the COVID-19 vaccine for free.   Which vaccine is the best? Which vaccine should I get?   All four vaccines are highly effective. Even if you get COVID-19 after being vaccinated, all of the vaccines help prevent severe disease, hospitalization, and complications.   Most people should get whichever vaccine is first available to them. However, women younger than 50 years old should consider the rare risk of blood clots with low platelets after vaccination with the J&J/Neris vaccine. This risk hasn't been seen with the other three vaccines.   Are the vaccines safe?   Yes. Hundreds of millions of people in the [place] have already safely received COVID-19 vaccines under the most intense safety monitoring in the history of the [place].   Do I need the vaccine if I've already had COVID?   Yes. Vaccination helps protect you even if you've already had COVID.   If you had COVID-19 and had symptoms, wait to get vaccinated until you've recovered and completed your isolation period.   If you tested positive for COVID-19 but did not have symptoms, you can get vaccinated after 5 full days have passed since you had a positive test, as long as you don't develop symptoms.   How many doses of the vaccine do I need?   Visit www.cdc.gov/coronavirus/2019-ncov/vaccines/stay-up-to-date.html   to find out how to stay up to date with your COVID-19 vaccines.   I'm immunocompromised. How many doses of the vaccine do I need?   For information on how immunocompromised people can stay up to date with their COVID-19 vaccines, visit www.cdc.gov/coronavirus/2019-ncov/vaccines/recommendations/immuno.html  .   What are the common side effects of the vaccine?   A sore arm,  tiredness, headache, and muscle pain may occur within two days of getting the vaccine and last a day or two. For the Moderna or Pfizer vaccines, side effects are more common after the second dose. People over the age of 55 are less likely to have side effects than younger people.   After I'm up to date on vaccines, can I still get or spread COVID?   Yes, you can still get COVID, but your disease should be milder. And your risk of serious illness, hospitalization, and complications will be much lower, especially if you're up to date. Unfortunately, you can still spread COVID if you've been vaccinated. That's why it's important to follow isolation guidelines if you get sick or test positive.   After I'm up to date on vaccines, can I go back to normal?   You should still wear a mask indoors in public if:    It's required by laws, rules, regulations, or local guidance.    You have a weakened immune system.    Your age puts you at increased risk of severe disease.    You have a medical condition that puts you at increased risk of severe disease.    Someone in your household has a weakened immune system, is at increased risk for severe disease, or is unvaccinated.    You're in an area of high transmission.   Where can I get a COVID-19 vaccine?   Visit [facility]'s website for more information. To find a COVID-19 vaccination site near you, visit www.vaccines.gov/  , call [phone]  , or text your zip code to [phone] ([employer] [place]). Message and data rates may apply.   What about travel?   Travel increases your risk of exposure to COVID-19. For more information, see www.cdc.gov/coronavirus/2019-ncov/travelers/index.html  .   I've had close contact with someone who has COVID. Do I need to quarantine, and if so, for how long?   For the most current answer, including a calculator to determine whether you need to stay home and for how long, visit www.cdc.gov/coronavirus/2019-ncov/your-health/quarantine-isolation.html  .   I've  tested positive for COVID. How long do I need to isolate?   For the latest recommendations, including a calculator to determine how long you need to stay home, visit www.cdc.gov/coronavirus/2019-ncov/your-health/quarantine-isolation.html  .   What if I develop symptoms that might be from COVID?   For the latest recommendations on what to do if you're sick, including when to seek emergency care, visit www.cdc.gov/coronavirus/2019-ncov/if-you-are-sick/steps-when-sick.html  .    Flu vaccine information   Who should get a flu vaccine?   Everyone 6 months of age and older should get a yearly flu vaccine.   When should I get vaccinated?   You should get a flu vaccine by the end of October. Once you're vaccinated, it takes about two weeks for antibodies to develop and protect you against the flu. That's why it's important to get vaccinated as soon as possible.   After October, is it too late to get vaccinated?   No. You should still get vaccinated. As long as the flu viruses are still in your community, flu vaccines will remain available, even into January of next year or later.   Why do I need a flu vaccine EVERY year?   Flu viruses are constantly changing, so flu vaccines are usually updated from one season to the next. Your protection from the flu vaccine also lessens over time.   Is the flu vaccine safe?   Yes. Over the last 50 years, hundreds of millions of Americans have safely received the flu vaccines.   What are the side effects of flu vaccines?   You CANNOT get the flu from a flu vaccine. Common side effects of the flu shot include soreness, redness and/or swelling where the shot was given, low grade fever, and aches. Common side effects of the nasal spray flu vaccine for adults include runny nose, headaches, sore throat, and cough. For children, side effects include wheezing, vomiting, muscle aches, and fever.   Does the flu vaccine increase your risk of getting COVID-19?   No. There is no evidence that getting  a flu vaccine increases your risk of getting COVID-19.   Is it safe to get the flu vaccine along with a COVID-19 vaccine?   Yes. It's safe to get the flu vaccine with a COVID-19 vaccine or booster.   Contact your healthcare provider TODAY for details on when and where to get your flu vaccine.   Your provider   Your diagnosis was provided by Macarena Myles, a member of your trusted care team at [facility].   If you have any questions, call us at [phone]  .   View Doctor's Note     Expires on 01/07/23

## 2023-05-15 ENCOUNTER — OFFICE VISIT (OUTPATIENT)
Dept: FAMILY MEDICINE CLINIC | Facility: CLINIC | Age: 53
End: 2023-05-15
Payer: COMMERCIAL

## 2023-05-15 VITALS
HEIGHT: 67 IN | HEART RATE: 56 BPM | WEIGHT: 128 LBS | BODY MASS INDEX: 20.09 KG/M2 | DIASTOLIC BLOOD PRESSURE: 62 MMHG | SYSTOLIC BLOOD PRESSURE: 112 MMHG | OXYGEN SATURATION: 98 %

## 2023-05-15 DIAGNOSIS — Z00.00 ANNUAL PHYSICAL EXAM: Primary | ICD-10-CM

## 2023-05-15 DIAGNOSIS — E78.5 MILD HYPERLIPIDEMIA: ICD-10-CM

## 2023-05-15 DIAGNOSIS — Z11.59 ENCOUNTER FOR HEPATITIS C SCREENING TEST FOR LOW RISK PATIENT: ICD-10-CM

## 2023-05-15 DIAGNOSIS — Z23 IMMUNIZATION DUE: ICD-10-CM

## 2023-05-15 DIAGNOSIS — Z12.5 ENCOUNTER FOR SPECIAL SCREENING EXAMINATION FOR NEOPLASM OF PROSTATE: ICD-10-CM

## 2023-05-15 NOTE — PATIENT INSTRUCTIONS
Medicare Wellness  Personal Prevention Plan of Service     Date of Office Visit:    Encounter Provider:  Palmer Church MD  Place of Service:  [facility]  Patient Name: Skyler Huerta  :  1970    As part of the Medicare Wellness portion of your visit today, we are providing you with this personalized preventive plan of services (PPPS). This plan is based upon recommendations of the [place] Preventive Services Task Force (USPSTF) and the Advisory Committee on Immunization Practices (ACIP).    This lists the preventive care services that should be considered, and provides dates of when you are due. Items listed as completed are up-to-date and do not require any further intervention.    Health Maintenance   Topic Date Due    HEPATITIS C SCREENING  Never done    ZOSTER VACCINE (1 of 2) Never done    COVID-19 Vaccine (4 - Booster for Pfizer series) 2022    LIPID PANEL  05/15/2023    INFLUENZA VACCINE  2023    ANNUAL PHYSICAL  05/15/2024    COLORECTAL CANCER SCREENING  2026    TDAP/TD VACCINES (2 - Td or Tdap) 05/15/2033    Pneumococcal Vaccine 0-64  Aged Out       Orders Placed This Encounter   Procedures    Tdap Vaccine Greater Than or Equal To 8yo IM    Comprehensive Metabolic Panel     Order Specific Question:   Release to patient     Answer:   Routine Release    Lipid Panel    PSA Screen     Order Specific Question:   Release to patient     Answer:   Routine Release    Hepatitis C Antibody     Order Specific Question:   Release to patient     Answer:   Routine Release       Return in about 1 year (around 5/15/2024) for Annual physical.

## 2023-05-15 NOTE — PROGRESS NOTES
Chief Complaint   Patient presents with   • Annual Exam       HPI:  Skyler Huerat, -1970, is a 53 y.o. male who presents for an annual physical.    Recent Hospitalizations:  No hospitalization(s) within the last year..    Current Medical Providers:  Patient Care Team:  Palmer Church MD as PCP - General (Internal Medicine)    Compared to one year ago, the patient feels his physical health is the same and his mental health is the same.    Depression Screen:      5/15/2023     9:50 AM   PHQ-2/PHQ-9 Depression Screening   Little Interest or Pleasure in Doing Things 0-->not at all   Feeling Down, Depressed or Hopeless 0-->not at all   PHQ-9: Brief Depression Severity Measure Score 0       Past Medical/Family/Social History:  The following portions of the patient's history were reviewed and updated as appropriate: allergies, current medications, past family history, past medical history, past social history, past surgical history and problem list.    No Known Allergies    No current outpatient medications on file.    Current medication list contains no high risk medications.  No harmful drug interactions have been identified.     Family History   Problem Relation Age of Onset   • No Known Problems Mother    • No Known Problems Father        Social History     Tobacco Use   • Smoking status: Never   • Smokeless tobacco: Never   Substance Use Topics   • Alcohol use: Not Currently     Alcohol/week: 14.0 standard drinks     Types: 12 Cans of beer, 2 Drinks containing 0.5 oz of alcohol per week       Past Surgical History:   Procedure Laterality Date   • COLONOSCOPY     • VASECTOMY         Patient Active Problem List   Diagnosis   • Annual physical exam   • Screening for colon cancer   • Encounter for special screening examination for neoplasm of prostate   • OAB (overactive bladder)   • Ganglion cyst of wrist, left   • Chemical induced allergic contact dermatitis   • Encounter for screening for malignant  "neoplasm of colon   • Mild hyperlipidemia       Review of Systems   Constitutional: Negative for activity change, appetite change, fatigue, fever, unexpected weight gain and unexpected weight loss.   HENT: Negative for nosebleeds, rhinorrhea, trouble swallowing and voice change.    Eyes: Negative for visual disturbance.   Respiratory: Negative for cough, chest tightness, shortness of breath and wheezing.    Cardiovascular: Negative for chest pain, palpitations and leg swelling.   Gastrointestinal: Negative for abdominal pain, blood in stool, constipation, diarrhea, nausea, vomiting, GERD and indigestion.   Genitourinary: Negative for dysuria, frequency and hematuria.   Musculoskeletal: Negative for arthralgias, back pain and myalgias.   Skin: Negative for rash and wound.   Neurological: Negative for dizziness, tremors, weakness, light-headedness, numbness, headache and memory problem.   Hematological: Negative for adenopathy. Does not bruise/bleed easily.   Psychiatric/Behavioral: Negative for sleep disturbance and depressed mood. The patient is not nervous/anxious.        Objective     Vitals:    05/15/23 0949   BP: 112/62   BP Location: Left arm   Patient Position: Sitting   Cuff Size: Adult   Pulse: 56   SpO2: 98%   Weight: 58.1 kg (128 lb)   Height: 170.2 cm (67\")       BMI is within normal parameters. No other follow-up for BMI required.      No results found.    Physical Exam  Vitals and nursing note reviewed.   Constitutional:       General: He is not in acute distress.     Appearance: He is well-developed. He is not diaphoretic.   HENT:      Head: Normocephalic and atraumatic.      Right Ear: External ear normal.      Left Ear: External ear normal.      Nose: Nose normal.   Eyes:      Conjunctiva/sclera: Conjunctivae normal.      Pupils: Pupils are equal, round, and reactive to light.   Neck:      Thyroid: No thyromegaly.      Trachea: No tracheal deviation.   Cardiovascular:      Rate and Rhythm: Normal " rate and regular rhythm.      Heart sounds: Normal heart sounds. No murmur heard.    No friction rub. No gallop.   Pulmonary:      Effort: Pulmonary effort is normal. No respiratory distress.      Breath sounds: Normal breath sounds.   Abdominal:      General: Bowel sounds are normal.      Palpations: Abdomen is soft. There is no mass.      Tenderness: There is no abdominal tenderness. There is no guarding.   Musculoskeletal:         General: Normal range of motion.      Cervical back: Normal range of motion and neck supple.   Lymphadenopathy:      Cervical: No cervical adenopathy.   Skin:     General: Skin is warm and dry.      Capillary Refill: Capillary refill takes less than 2 seconds.      Findings: No rash.   Neurological:      Mental Status: He is alert and oriented to person, place, and time.      Motor: No abnormal muscle tone.      Deep Tendon Reflexes: Reflexes normal.   Psychiatric:         Behavior: Behavior normal.         Thought Content: Thought content normal.         Judgment: Judgment normal.     Recent Lab Results:     Lab Results   Component Value Date    TRIG 44 06/10/2020    HDL 72 (H) 06/10/2020    VLDL 8.8 06/10/2020       Assessment & Plan   Age-appropriate Screening Schedule:  Refer to the list below for future screening recommendations based on patient's age, sex and/or medical conditions.      Health Maintenance   Topic Date Due   • TDAP/TD VACCINES (1 - Tdap) Never done   • HEPATITIS C SCREENING  Never done   • ZOSTER VACCINE (1 of 2) Never done   • ANNUAL PHYSICAL  06/10/2021   • COVID-19 Vaccine (4 - Booster for Pfizer series) 02/12/2022   • LIPID PANEL  05/15/2023   • INFLUENZA VACCINE  08/01/2023   • COLORECTAL CANCER SCREENING  07/09/2026   • Pneumococcal Vaccine 0-64  Aged Out       Diagnoses and all orders for this visit:    1. Annual physical exam (Primary)    2. Encounter for special screening examination for neoplasm of prostate  -     PSA Screen    3. Mild hyperlipidemia  -      Comprehensive Metabolic Panel  -     Lipid Panel    4. Immunization due  -     Tdap Vaccine Greater Than or Equal To 8yo IM    5. Encounter for hepatitis C screening test for low risk patient  -     Hepatitis C Antibody    Annual wellness visit reviewed with patient.  All past history, medications, social history, and problem list were reviewed.  Discussed advanced directives and living will.  Patient has living will: Living will: no and information packet given to patient to complete at home and bring copy to office.  Will check the labs as ordered above to evaluate the blood sugars, kidney, liver, cholesterol for screening.  Discussed flu shot recommended to get the influenza vaccine annually in the fall.  Tdap, covid booster, Shingrix vaccination series discussed.  Encouraged follow-up with the eye doctor on annual basis.  Discussed weight and encouraged exercise as tolerated while following a healthy diet.  Reviewed sexual health and safe sex practices.  Colon cancer screening discussed and current status: colonoscopy last completed on 7/9/2021 and repeat after 5 years is recommended.  Discussed prostate screening.  Follow up with current specialists as needed.     An After Visit Summary with all of these plans were given to the patient.        Follow Up:  Return in about 1 year (around 5/15/2024) for Annual physical.

## 2023-05-16 LAB
ALBUMIN SERPL-MCNC: 5.1 G/DL (ref 3.5–5.2)
ALBUMIN/GLOB SERPL: 2.2 G/DL
ALP SERPL-CCNC: 68 U/L (ref 39–117)
ALT SERPL-CCNC: 44 U/L (ref 1–41)
AST SERPL-CCNC: 43 U/L (ref 1–40)
BILIRUB SERPL-MCNC: 0.3 MG/DL (ref 0–1.2)
BUN SERPL-MCNC: 9 MG/DL (ref 6–20)
BUN/CREAT SERPL: 9.1 (ref 7–25)
CALCIUM SERPL-MCNC: 10 MG/DL (ref 8.6–10.5)
CHLORIDE SERPL-SCNC: 105 MMOL/L (ref 98–107)
CHOLEST SERPL-MCNC: 201 MG/DL (ref 0–200)
CO2 SERPL-SCNC: 26.6 MMOL/L (ref 22–29)
CREAT SERPL-MCNC: 0.99 MG/DL (ref 0.76–1.27)
EGFRCR SERPLBLD CKD-EPI 2021: 91.1 ML/MIN/1.73
GLOBULIN SER CALC-MCNC: 2.3 GM/DL
GLUCOSE SERPL-MCNC: 87 MG/DL (ref 65–99)
HCV IGG SERPL QL IA: NON REACTIVE
HDLC SERPL-MCNC: 80 MG/DL (ref 40–60)
LDLC SERPL CALC-MCNC: 113 MG/DL (ref 0–100)
POTASSIUM SERPL-SCNC: 4.5 MMOL/L (ref 3.5–5.2)
PROT SERPL-MCNC: 7.4 G/DL (ref 6–8.5)
PSA SERPL-MCNC: 1.56 NG/ML (ref 0–4)
SODIUM SERPL-SCNC: 144 MMOL/L (ref 136–145)
TRIGL SERPL-MCNC: 41 MG/DL (ref 0–150)
VLDLC SERPL CALC-MCNC: 8 MG/DL (ref 5–40)

## 2023-09-26 ENCOUNTER — OFFICE VISIT (OUTPATIENT)
Dept: FAMILY MEDICINE CLINIC | Facility: CLINIC | Age: 53
End: 2023-09-26
Payer: COMMERCIAL

## 2023-09-26 VITALS
BODY MASS INDEX: 19.58 KG/M2 | TEMPERATURE: 98.7 F | DIASTOLIC BLOOD PRESSURE: 86 MMHG | SYSTOLIC BLOOD PRESSURE: 140 MMHG | OXYGEN SATURATION: 98 % | WEIGHT: 125 LBS | HEART RATE: 74 BPM

## 2023-09-26 DIAGNOSIS — R11.2 NAUSEA VOMITING AND DIARRHEA: ICD-10-CM

## 2023-09-26 DIAGNOSIS — A08.4 VIRAL GASTROENTERITIS: Primary | ICD-10-CM

## 2023-09-26 DIAGNOSIS — R19.7 NAUSEA VOMITING AND DIARRHEA: ICD-10-CM

## 2023-09-26 DIAGNOSIS — R41.0 CONFUSION: ICD-10-CM

## 2023-09-26 PROCEDURE — 99213 OFFICE O/P EST LOW 20 MIN: CPT | Performed by: STUDENT IN AN ORGANIZED HEALTH CARE EDUCATION/TRAINING PROGRAM

## 2023-09-26 RX ORDER — ONDANSETRON 4 MG/1
4 TABLET, ORALLY DISINTEGRATING ORAL EVERY 8 HOURS PRN
Qty: 20 TABLET | Refills: 0 | Status: SHIPPED | OUTPATIENT
Start: 2023-09-26

## 2023-09-26 RX ORDER — AZITHROMYCIN 250 MG/1
TABLET, FILM COATED ORAL
COMMUNITY
Start: 2023-09-21 | End: 2023-09-28

## 2023-09-26 NOTE — LETTER
September 26, 2023     Patient: Skyler Huerta   YOB: 1970   Date of Visit: 9/26/2023       To Whom It May Concern:    It is my medical opinion that Skyler Huerta may return to work on 9/29/23.          Sincerely,        Misty Lee DO    CC: No Recipients

## 2023-09-26 NOTE — PROGRESS NOTES
"Chief Complaint  Fever    David Huerta presents to [facility]  History of Present Illness    Started with cold chills last Saturday. Lasted until Monday. Tuesday started with vomiting, fever, cold chills, diarrhea, body aches, light sensitivity. Today neck feels inflamed. Feels confused and delusional. Cannot connect words to say what he wants to say sometimes. Drove himself to the clinic. Took an advil first. Has never had a meningitis vaccine. Vomiting is after he eats something heavy. Diarrhea is every day.   Went to the ED at [place] on 9/24/23. Can hold down water if he drinks it. Has been trying to drink lots of water. Has has one episode of diarrhea today. No vomiting today.   Able to answer questions clearly today without confusion. Has some turning in his stomach. Has been burping and belching.       Objective   Vital Signs:  /86 (BP Location: Right arm, Patient Position: Sitting, Cuff Size: Adult)   Pulse 74   Temp 98.7 °F (37.1 °C)   Wt 56.7 kg (125 lb)   SpO2 98%   BMI 19.58 kg/m²   Estimated body mass index is 19.58 kg/m² as calculated from the following:    Height as of 5/15/23: 170.2 cm (67\").    Weight as of this encounter: 56.7 kg (125 lb).       BMI is within normal parameters. No other follow-up for BMI required.      Physical Exam  Vitals and nursing note reviewed.   Constitutional:       General: He is not in acute distress.     Appearance: Normal appearance. He is not ill-appearing.   HENT:      Head: Normocephalic and atraumatic.      Nose: Nose normal.      Mouth/Throat:      Mouth: Mucous membranes are moist.   Eyes:      Extraocular Movements: Extraocular movements intact.      Conjunctiva/sclera: Conjunctivae normal.   Cardiovascular:      Rate and Rhythm: Normal rate and regular rhythm.      Heart sounds: Normal heart sounds. No murmur heard.    No gallop.   Pulmonary:      Effort: Pulmonary effort is normal. No respiratory " distress.      Breath sounds: Normal breath sounds. No stridor. No wheezing, rhonchi or rales.   Chest:      Chest wall: No tenderness.   Skin:     General: Skin is warm and dry.   Neurological:      General: No focal deficit present.      Mental Status: He is alert and oriented to person, place, and time. Mental status is at baseline.   Psychiatric:         Mood and Affect: Mood normal.         Behavior: Behavior normal.      Result Review :                   Assessment and Plan   Diagnoses and all orders for this visit:    1. Viral gastroenteritis (Primary)  -     Comprehensive metabolic panel  -     CBC w AUTO Differential  -     Sedimentation Rate  -     C-reactive protein  -     Procalcitonin  -     Hepatitis panel, acute    2. Nausea vomiting and diarrhea  -     Comprehensive metabolic panel  -     CBC w AUTO Differential  -     Sedimentation Rate  -     C-reactive protein  -     Procalcitonin  -     Hepatitis panel, acute  -     ondansetron ODT (ZOFRAN-ODT) 4 MG disintegrating tablet; Place 1 tablet on the tongue Every 8 (Eight) Hours As Needed for Nausea or Vomiting.  Dispense: 20 tablet; Refill: 0    3. Confusion  -     Comprehensive metabolic panel  -     CBC w AUTO Differential  -     Sedimentation Rate  -     C-reactive protein  -     Procalcitonin  -     Hepatitis panel, acute        Labs today. Fever has improved. Nausea and vomiting decreasing in frequency. Says he has some trouble with word finding but is mentally appropriate and A&Ox4 today on exam. Brought himself to the appointment and is alone. Normal neuro exam. Some pain with bending neck forward. Meningitis risk seems low due to improvement in his symptoms, normal mentation today. However discussed if neck pain or confusion worsen will go directly to the ED. Will see back for close follow up in two days. Work note provided for the next two days.        Follow Up   Return in about 2 days (around 9/28/2023).  Patient was given instructions and  counseling regarding his condition or for health maintenance advice. Please see specific information pulled into the AVS if appropriate.

## 2023-09-28 ENCOUNTER — OFFICE VISIT (OUTPATIENT)
Dept: FAMILY MEDICINE CLINIC | Facility: CLINIC | Age: 53
End: 2023-09-28
Payer: COMMERCIAL

## 2023-09-28 VITALS
OXYGEN SATURATION: 98 % | BODY MASS INDEX: 19.58 KG/M2 | WEIGHT: 125 LBS | TEMPERATURE: 97.7 F | DIASTOLIC BLOOD PRESSURE: 82 MMHG | HEART RATE: 74 BPM | SYSTOLIC BLOOD PRESSURE: 136 MMHG

## 2023-09-28 DIAGNOSIS — R19.7 NAUSEA VOMITING AND DIARRHEA: ICD-10-CM

## 2023-09-28 DIAGNOSIS — A08.4 VIRAL GASTROENTERITIS: Primary | ICD-10-CM

## 2023-09-28 DIAGNOSIS — R11.2 NAUSEA VOMITING AND DIARRHEA: ICD-10-CM

## 2023-09-28 LAB
ALBUMIN SERPL-MCNC: 4.8 G/DL (ref 3.8–4.9)
ALBUMIN/GLOB SERPL: 1.6 {RATIO} (ref 1.2–2.2)
ALP SERPL-CCNC: 75 IU/L (ref 44–121)
ALT SERPL-CCNC: 54 IU/L (ref 0–44)
AST SERPL-CCNC: 31 IU/L (ref 0–40)
BASOPHILS # BLD AUTO: 0.1 X10E3/UL (ref 0–0.2)
BASOPHILS NFR BLD AUTO: 1 %
BILIRUB SERPL-MCNC: 0.2 MG/DL (ref 0–1.2)
BUN SERPL-MCNC: 8 MG/DL (ref 6–24)
BUN/CREAT SERPL: 8 (ref 9–20)
CALCIUM SERPL-MCNC: 9.6 MG/DL (ref 8.7–10.2)
CHLORIDE SERPL-SCNC: 99 MMOL/L (ref 96–106)
CO2 SERPL-SCNC: 23 MMOL/L (ref 20–29)
CREAT SERPL-MCNC: 0.99 MG/DL (ref 0.76–1.27)
CRP SERPL-MCNC: 43 MG/L (ref 0–10)
EGFRCR SERPLBLD CKD-EPI 2021: 91 ML/MIN/1.73
EOSINOPHIL # BLD AUTO: 0.1 X10E3/UL (ref 0–0.4)
EOSINOPHIL NFR BLD AUTO: 1 %
ERYTHROCYTE [DISTWIDTH] IN BLOOD BY AUTOMATED COUNT: 14.7 % (ref 11.6–15.4)
ERYTHROCYTE [SEDIMENTATION RATE] IN BLOOD BY WESTERGREN METHOD: 20 MM/HR (ref 0–30)
GLOBULIN SER CALC-MCNC: 3 G/DL (ref 1.5–4.5)
GLUCOSE SERPL-MCNC: 92 MG/DL (ref 70–99)
HAV IGM SERPL QL IA: NEGATIVE
HBV CORE IGM SERPL QL IA: NEGATIVE
HBV SURFACE AG SERPL QL IA: NEGATIVE
HCT VFR BLD AUTO: 44.6 % (ref 37.5–51)
HCV AB SERPL QL IA: NORMAL
HCV IGG SERPL QL IA: NON REACTIVE
HGB BLD-MCNC: 14.2 G/DL (ref 13–17.7)
IMM GRANULOCYTES # BLD AUTO: 0.1 X10E3/UL (ref 0–0.1)
IMM GRANULOCYTES NFR BLD AUTO: 2 %
LYMPHOCYTES # BLD AUTO: 1.4 X10E3/UL (ref 0.7–3.1)
LYMPHOCYTES NFR BLD AUTO: 28 %
MCH RBC QN AUTO: 26.1 PG (ref 26.6–33)
MCHC RBC AUTO-ENTMCNC: 31.8 G/DL (ref 31.5–35.7)
MCV RBC AUTO: 82 FL (ref 79–97)
MONOCYTES # BLD AUTO: 0.6 X10E3/UL (ref 0.1–0.9)
MONOCYTES NFR BLD AUTO: 12 %
NEUTROPHILS # BLD AUTO: 2.8 X10E3/UL (ref 1.4–7)
NEUTROPHILS NFR BLD AUTO: 56 %
PLATELET # BLD AUTO: 374 X10E3/UL (ref 150–450)
POTASSIUM SERPL-SCNC: 4.8 MMOL/L (ref 3.5–5.2)
PROCALCITONIN SERPL-MCNC: 0.13 NG/ML (ref 0–0.08)
PROT SERPL-MCNC: 7.8 G/DL (ref 6–8.5)
RBC # BLD AUTO: 5.44 X10E6/UL (ref 4.14–5.8)
SODIUM SERPL-SCNC: 140 MMOL/L (ref 134–144)
WBC # BLD AUTO: 4.9 X10E3/UL (ref 3.4–10.8)

## 2023-09-28 PROCEDURE — 99213 OFFICE O/P EST LOW 20 MIN: CPT | Performed by: STUDENT IN AN ORGANIZED HEALTH CARE EDUCATION/TRAINING PROGRAM

## 2023-09-28 NOTE — PROGRESS NOTES
"Chief Complaint  viral gastroenteritis    Subjective        Skyler Huerta presents to [facility]  History of Present Illness    Answers submitted by the patient for this visit:  Primary Reason for Visit (Submitted on 9/26/2023)  What is the primary reason for your visit?: Fever    Feeling a little better today than last visit.  Diarrhea has now some slightly more formed stool.   Temp has been .   Neck pain has improved.   Headaches are similar. Taking less advil.   Has been eating and drinking well.     Objective   Vital Signs:  /82 (BP Location: Right arm, Patient Position: Sitting, Cuff Size: Large Adult)   Pulse 74   Temp 97.7 °F (36.5 °C)   Wt 56.7 kg (125 lb)   SpO2 98%   BMI 19.58 kg/m²   Estimated body mass index is 19.58 kg/m² as calculated from the following:    Height as of 5/15/23: 170.2 cm (67\").    Weight as of this encounter: 56.7 kg (125 lb).       BMI is within normal parameters. No other follow-up for BMI required.      Physical Exam  Vitals and nursing note reviewed.   Constitutional:       General: He is not in acute distress.     Appearance: Normal appearance. He is not ill-appearing.   HENT:      Head: Normocephalic and atraumatic.      Nose: Nose normal.      Mouth/Throat:      Mouth: Mucous membranes are moist.   Eyes:      Extraocular Movements: Extraocular movements intact.      Conjunctiva/sclera: Conjunctivae normal.   Cardiovascular:      Rate and Rhythm: Normal rate and regular rhythm.      Heart sounds: Normal heart sounds. No murmur heard.    No gallop.   Pulmonary:      Effort: Pulmonary effort is normal. No respiratory distress.      Breath sounds: Normal breath sounds. No stridor. No wheezing, rhonchi or rales.   Chest:      Chest wall: No tenderness.   Skin:     General: Skin is warm and dry.   Neurological:      General: No focal deficit present.      Mental Status: He is alert and oriented to person, place, and time. Mental status " is at baseline.   Psychiatric:         Mood and Affect: Mood normal.         Behavior: Behavior normal.      Result Review :                   Assessment and Plan   Diagnoses and all orders for this visit:    1. Viral gastroenteritis (Primary)    2. Nausea vomiting and diarrhea      Slow improvement in symptoms. Off work until Monday. If anything worsens patient should be reevaluated.        Follow Up   No follow-ups on file.  Patient was given instructions and counseling regarding his condition or for health maintenance advice. Please see specific information pulled into the AVS if appropriate.

## 2023-09-28 NOTE — LETTER
September 29, 2023     Patient: Skyler Huerta   YOB: 1970   Date of Visit: 9/28/2023       To Whom It May Concern:    It is my medical opinion that Skyler Huerta may return to work Monday 10/2.         Sincerely,        Misty Lee DO    CC: No Recipients

## 2023-09-29 ENCOUNTER — TELEPHONE (OUTPATIENT)
Dept: FAMILY MEDICINE CLINIC | Facility: CLINIC | Age: 53
End: 2023-09-29
Payer: COMMERCIAL

## 2023-09-29 NOTE — TELEPHONE ENCOUNTER
Caller: Skyler Huerta    Relationship: Self    Best call back number: [phone]     What was the call regarding: PATIENT WAS IN 9/28 AND GOT A WORK NOTE FROM DR. LOFTON IT WAS SUPPOSE TO BE DATED RETURN TO WORK ON 10/2 BUT IT SAYS 9/29. PATIENT IS NEEDING A NEW NOTE. HE WOULD LIKE TO  TODAY. PLEASE CALL.

## 2023-10-04 ENCOUNTER — OFFICE VISIT (OUTPATIENT)
Dept: FAMILY MEDICINE CLINIC | Facility: CLINIC | Age: 53
End: 2023-10-04
Payer: COMMERCIAL

## 2023-10-04 VITALS
WEIGHT: 123 LBS | HEIGHT: 67 IN | HEART RATE: 82 BPM | BODY MASS INDEX: 19.3 KG/M2 | TEMPERATURE: 98 F | DIASTOLIC BLOOD PRESSURE: 88 MMHG | SYSTOLIC BLOOD PRESSURE: 146 MMHG | OXYGEN SATURATION: 98 %

## 2023-10-04 DIAGNOSIS — R50.9 FEVER, UNSPECIFIED FEVER CAUSE: Primary | ICD-10-CM

## 2023-10-04 DIAGNOSIS — Z72.51 HIGH RISK HETEROSEXUAL BEHAVIOR: ICD-10-CM

## 2023-10-04 PROCEDURE — 99214 OFFICE O/P EST MOD 30 MIN: CPT | Performed by: INTERNAL MEDICINE

## 2023-10-04 RX ORDER — CYCLOBENZAPRINE HCL 10 MG
10 TABLET ORAL
COMMUNITY
Start: 2023-09-30

## 2023-10-04 RX ORDER — DOXYCYCLINE HYCLATE 100 MG/1
100 CAPSULE ORAL 2 TIMES DAILY
Qty: 20 CAPSULE | Refills: 0 | Status: SHIPPED | OUTPATIENT
Start: 2023-10-04

## 2023-10-04 RX ORDER — DICLOFENAC SODIUM 75 MG/1
75 TABLET, DELAYED RELEASE ORAL
COMMUNITY
Start: 2023-09-30

## 2023-10-04 NOTE — PROGRESS NOTES
David Huerta is a 53 y.o. male.     Chief Complaint   Patient presents with    Follow-up     9/20/2023- Went to the urgent care and they gave him antibiotics and took blood. He had vomiting, fever, Swets and cold chills, stiffness in neck and back, confusion, light sensitivity.     9/34/2023- He went to the ER because he wasn't getting better     9/30/2023- He went to  ER because he had an issue with his ankle    He is not having fevers anymore but is having stiffness, cold chills, dizziness, weakness, fatigue, light sensitivity       History of Present Illness   Patient with fever and chills starting 9/18/23 that progressed to N, V, F, Chills, diarrhea, stiff neck, body aches, light sensitivity and some confusion.  Seen in UC and ER with labs and given zpac and slow improvement over two weeks but now feels like starting to go back feeling bad with headache, nausea, hot flashes, weak feeling, and stiffness.  No rashes.  Did have a right ankle with swelling and pain 9/30/23. Went to ER and xray negative.  The ankle is better.  No known tick bites or recent travel.      The following portions of the patient's history were reviewed and updated as appropriate: allergies, current medications, past family history, past medical history, past social history, past surgical history and problem list.    Depression Screen:      5/15/2023     9:50 AM   PHQ-2/PHQ-9 Depression Screening   Little Interest or Pleasure in Doing Things 0-->not at all   Feeling Down, Depressed or Hopeless 0-->not at all   PHQ-9: Brief Depression Severity Measure Score 0       Past Medical History:   Diagnosis Date    Acute frontal sinusitis     Colon polyp 2021    Removed    HL (hearing loss) 1993    Left ear       Past Surgical History:   Procedure Laterality Date    COLONOSCOPY  2021    VASECTOMY  2005       Family History   Problem Relation Age of Onset    No Known Problems Mother     No Known Problems Father        Social History      Socioeconomic History    Marital status:    Tobacco Use    Smoking status: Never    Smokeless tobacco: Never   Substance and Sexual Activity    Alcohol use: Not Currently     Alcohol/week: 14.0 standard drinks     Types: 12 Cans of beer, 2 Drinks containing 0.5 oz of alcohol per week    Drug use: Yes     Frequency: 2.0 times per week     Types: Marijuana    Sexual activity: Yes     Partners: Female     Birth control/protection: Vasectomy       Current Outpatient Medications   Medication Sig Dispense Refill    cyclobenzaprine (FLEXERIL) 10 MG tablet Take 1 tablet by mouth.      diclofenac (VOLTAREN) 75 MG EC tablet Take 1 tablet by mouth.      ondansetron ODT (ZOFRAN-ODT) 4 MG disintegrating tablet Place 1 tablet on the tongue Every 8 (Eight) Hours As Needed for Nausea or Vomiting. 20 tablet 0    doxycycline (VIBRAMYCIN) 100 MG capsule Take 1 capsule by mouth 2 (Two) Times a Day. 20 capsule 0     No current facility-administered medications for this visit.       Review of Systems   Constitutional:  Positive for chills and fever. Negative for activity change, appetite change, fatigue, unexpected weight gain and unexpected weight loss.   HENT:  Negative for nosebleeds, rhinorrhea, trouble swallowing and voice change.    Eyes:  Negative for visual disturbance.   Respiratory:  Negative for cough, chest tightness, shortness of breath and wheezing.    Cardiovascular:  Negative for chest pain, palpitations and leg swelling.   Gastrointestinal:  Positive for nausea and vomiting. Negative for abdominal pain, blood in stool, constipation, diarrhea, GERD and indigestion.   Genitourinary:  Negative for dysuria, frequency and hematuria.   Musculoskeletal:  Negative for arthralgias, back pain and myalgias.        Back and neck stiffness/soreness, body aches.  Did have some ankle pain and swelling but is now resolved.   Skin:  Negative for rash and wound.   Neurological:  Positive for dizziness. Negative for tremors,  "weakness, light-headedness, numbness, headache and memory problem.        Patient states has some confusion but is really having a harder time more concentrating than anything.   Hematological:  Negative for adenopathy. Does not bruise/bleed easily.   Psychiatric/Behavioral:  Negative for sleep disturbance and depressed mood. The patient is not nervous/anxious.      Objective   /88 (BP Location: Left arm, Patient Position: Sitting, Cuff Size: Adult)   Pulse 82   Temp 98 °F (36.7 °C) (Temporal)   Ht 170.2 cm (67\")   Wt 55.8 kg (123 lb)   SpO2 98%   BMI 19.26 kg/m²     Physical Exam  Vitals and nursing note reviewed.   Constitutional:       General: He is not in acute distress.     Appearance: He is well-developed. He is not diaphoretic.   HENT:      Head: Normocephalic and atraumatic.      Right Ear: External ear normal.      Left Ear: External ear normal.      Nose: Nose normal.      Mouth/Throat:      Mouth: Mucous membranes are moist.      Pharynx: Oropharynx is clear.   Eyes:      General: No scleral icterus.        Right eye: No discharge.         Left eye: No discharge.      Extraocular Movements: Extraocular movements intact.      Conjunctiva/sclera: Conjunctivae normal.      Pupils: Pupils are equal, round, and reactive to light.      Comments: Able to utilize ophthalmoscope and to visualize the eyes without any abnormalities or papilledema.     Neck:      Thyroid: No thyromegaly.      Trachea: No tracheal deviation.   Cardiovascular:      Rate and Rhythm: Normal rate and regular rhythm.      Heart sounds: Normal heart sounds. No murmur heard.    No friction rub. No gallop.   Pulmonary:      Effort: Pulmonary effort is normal. No respiratory distress.      Breath sounds: Normal breath sounds.   Abdominal:      General: Bowel sounds are normal.      Palpations: Abdomen is soft. There is no mass.      Tenderness: There is no abdominal tenderness. There is no guarding.   Musculoskeletal:         " General: Normal range of motion.      Cervical back: Normal range of motion and neck supple.      Comments: Patient with no nuchal rigidity and full range of motion of the neck.     Lymphadenopathy:      Cervical: No cervical adenopathy.   Skin:     General: Skin is warm and dry.      Capillary Refill: Capillary refill takes less than 2 seconds.      Findings: No rash.   Neurological:      Mental Status: He is alert and oriented to person, place, and time.      Motor: No abnormal muscle tone.      Deep Tendon Reflexes: Reflexes normal.   Psychiatric:         Behavior: Behavior normal.         Thought Content: Thought content normal.         Judgment: Judgment normal.       Recent Results (from the past 2016 hour(s))   CBC AND DIFFERENTIAL    Collection Time: 09/24/23  7:34 AM    Specimen type and source: Whole Blood, Blood   Result Value Ref Range    WBC 5.54 4.5 - 11.0 10*3/uL    RBC 4.56 4.5 - 5.9 10*6/uL    Hemoglobin 11.8 (L) 13.5 - 17.5 g/dL    Hematocrit 36.0 (L) 41.0 - 53.0 %    MCV 78.9 (L) 80.0 - 100.0 fL    MCH 25.9 (L) 26.0 - 34.0 pg    MCHC 32.8 31.0 - 37.0 g/dL    RDW 14.9 12.0 - 16.8 %    Platelets 239 140 - 440 10*3/uL    MPV 9.6 8.4 - 12.4 fL    Differential Type [facility] CBC w/AutoDiff (arb'U)    Neutrophil Rel % 66.0 45 - 80 %    Lymphocyte Rel % 17.3 15 - 50 %    Monocyte Rel % 14.4 0 - 15 %    Eosinophil % 1.1 0 - 7 %    Basophil Rel % 0.5 0 - 2 %    Immature Grans % 0.7 0.0 - 1.0 %    nRBC 0 0 /100(WBC)    Neutrophils Absolute 3.65 2.0 - 8.8 10*3/uL    Lymphocytes Absolute 0.96 0.7 - 5.5 10*3/uL    Monocytes Absolute 0.80 0.0 - 1.7 10*3/uL    Eosinophils Absolute 0.06 0.0 - 0.8 10*3/uL    Basophils Absolute 0.03 0.0 - 0.2 10*3/uL    Immature Grans, Absolute 0.04 0.00 - 0.10 10*3/uL   Comprehensive metabolic panel    Collection Time: 09/26/23  2:32 PM    Specimen: Blood   Result Value Ref Range    Glucose 92 70 - 99 mg/dL    BUN 8 6 - 24 mg/dL    Creatinine 0.99 0.76 - 1.27 mg/dL    EGFR Result  91 >59 mL/min/1.73    BUN/Creatinine Ratio 8 (L) 9 - 20    Sodium 140 134 - 144 mmol/L    Potassium 4.8 3.5 - 5.2 mmol/L    Chloride 99 96 - 106 mmol/L    Total CO2 23 20 - 29 mmol/L    Calcium 9.6 8.7 - 10.2 mg/dL    Total Protein 7.8 6.0 - 8.5 g/dL    Albumin 4.8 3.8 - 4.9 g/dL    Globulin 3.0 1.5 - 4.5 g/dL    A/G Ratio 1.6 1.2 - 2.2    Total Bilirubin 0.2 0.0 - 1.2 mg/dL    Alkaline Phosphatase 75 44 - 121 IU/L    AST (SGOT) 31 0 - 40 IU/L    ALT (SGPT) 54 (H) 0 - 44 IU/L   CBC w AUTO Differential    Collection Time: 09/26/23  2:32 PM    Specimen: Blood   Result Value Ref Range    WBC 4.9 3.4 - 10.8 x10E3/uL    RBC 5.44 4.14 - 5.80 x10E6/uL    Hemoglobin 14.2 13.0 - 17.7 g/dL    Hematocrit 44.6 37.5 - 51.0 %    MCV 82 79 - 97 fL    MCH 26.1 (L) 26.6 - 33.0 pg    MCHC 31.8 31.5 - 35.7 g/dL    RDW 14.7 11.6 - 15.4 %    Platelets 374 150 - 450 x10E3/uL    Neutrophil Rel % 56 Not Estab. %    Lymphocyte Rel % 28 Not Estab. %    Monocyte Rel % 12 Not Estab. %    Eosinophil Rel % 1 Not Estab. %    Basophil Rel % 1 Not Estab. %    Neutrophils Absolute 2.8 1.4 - 7.0 x10E3/uL    Lymphocytes Absolute 1.4 0.7 - 3.1 x10E3/uL    Monocytes Absolute 0.6 0.1 - 0.9 x10E3/uL    Eosinophils Absolute 0.1 0.0 - 0.4 x10E3/uL    Basophils Absolute 0.1 0.0 - 0.2 x10E3/uL    Immature Granulocyte Rel % 2 Not Estab. %    Immature Grans Absolute 0.1 0.0 - 0.1 x10E3/uL   Sedimentation Rate    Collection Time: 09/26/23  2:32 PM    Specimen: Blood   Result Value Ref Range    Sed Rate 20 0 - 30 mm/hr   C-reactive protein    Collection Time: 09/26/23  2:32 PM    Specimen: Blood   Result Value Ref Range    C-Reactive Protein 43 (H) 0 - 10 mg/L   Procalcitonin    Collection Time: 09/26/23  2:32 PM    Specimen: Blood   Result Value Ref Range    Procalcitonin 0.13 (H) 0.00 - 0.08 ng/mL   Hepatitis panel, acute    Collection Time: 09/26/23  2:32 PM    Specimen: Blood   Result Value Ref Range    Hep A IgM Negative Negative    Hepatitis B Surface Ag  Negative Negative    Hep B Core IgM Negative Negative    Hepatitis C Ab Non Reactive Non Reactive   Interpretation:    Collection Time: 09/26/23  2:32 PM   Result Value Ref Range    Interpretation Comment      Assessment & Plan   Diagnoses and all orders for this visit:    1. Fever, unspecified fever cause (Primary)  -     CBC & Differential  -     Lyme Disease Total Antibody With Reflex to Immunoassay  -     [facility] (IgG / M)  -     Chlamydia trachomatis, Neisseria gonorrhoeae, PCR - Urine, Urine, Clean Catch  -     HIV-1 / O / 2 Ag / Antibody  -     RPR  -     doxycycline (VIBRAMYCIN) 100 MG capsule; Take 1 capsule by mouth 2 (Two) Times a Day.  Dispense: 20 capsule; Refill: 0    2. High risk heterosexual behavior  -     Chlamydia trachomatis, Neisseria gonorrhoeae, PCR - Urine, Urine, Clean Catch  -     HIV-1 / O / 2 Ag / Antibody  -     RPR    Labs and imaging reviewed from the hospital and emergency rooms.  Patient with unknown origin fever cannot rule out rickettsial tick bite issues, STD.  However patient does not appear to be evidence for meningitis as he is actually moving about no nuchal rigidity moving head without any problems and is in a bright room and not appearing to be uncomfortable.  We will treat with antibiotic while we wait for the test results.  If he has persisting or worsening problems and follow-up to go emergency room.         COVID-19 Precautions - Patient was compliant in wearing a mask. When I saw the patient, I used appropriate personal protective equipment (PPE) including mask and eye shield (standard procedure).  Additionally, I used gown and gloves if indicated.  Hand hygiene was completed before and after seeing the patient.  Dictated utilizing Dragon Dictation

## 2023-10-05 ENCOUNTER — TELEPHONE (OUTPATIENT)
Dept: FAMILY MEDICINE CLINIC | Facility: CLINIC | Age: 53
End: 2023-10-05

## 2023-10-05 ENCOUNTER — TELEPHONE (OUTPATIENT)
Dept: FAMILY MEDICINE CLINIC | Facility: CLINIC | Age: 53
End: 2023-10-05
Payer: COMMERCIAL

## 2023-10-05 NOTE — TELEPHONE ENCOUNTER
OKAY FOR HUB TO RELAY    I tried calling the patient but did not get an answer. I left a brief message as there was no clear identification. 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

## 2023-10-05 NOTE — TELEPHONE ENCOUNTER
I called and spoke with Skyler over a fax I had received for a disability claim. I asked if he wanted to go through with this and he advised he did. 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 I advised that I would have him fill it out and give him a call when it was done so he can get the paper and fill out his portion. He verbalized understanding and will reach out if he needs anything. The papers have been laid on Dr Church's desk.

## 2023-10-05 NOTE — TELEPHONE ENCOUNTER
Caller: Skyler Huerta    Relationship: Self    Best call back number: [phone]    What is the best time to reach you: ANYTIME     Who are you requesting to speak with (clinical staff, provider,  specific staff member): CLINICAL STAFF     Do you know the name of the person who called: SELF     What was the call regarding: PATIENT CALLED AND RETURNED HUB RELAY MESSAGE . PATIENT STATES HIS LAST DAY OF WORK WAS 10/3/23 AND PLANS TO RETURN TO WORK ON 10/10/23. THANKS     Is it okay if the provider responds through [employer]: YES

## 2023-10-06 LAB
C TRACH RRNA SPEC QL NAA+PROBE: NEGATIVE
N GONORRHOEA RRNA SPEC QL NAA+PROBE: NEGATIVE

## 2023-10-10 ENCOUNTER — TELEPHONE (OUTPATIENT)
Dept: FAMILY MEDICINE CLINIC | Facility: CLINIC | Age: 53
End: 2023-10-10

## 2023-10-10 LAB
B BURGDOR IGG+IGM SER QL IA: NEGATIVE
BASOPHILS # BLD AUTO: 0 X10E3/UL (ref 0–0.2)
BASOPHILS NFR BLD AUTO: 1 %
EOSINOPHIL # BLD AUTO: 0.1 X10E3/UL (ref 0–0.4)
EOSINOPHIL NFR BLD AUTO: 2 %
ERYTHROCYTE [DISTWIDTH] IN BLOOD BY AUTOMATED COUNT: 14.8 % (ref 11.6–15.4)
HCT VFR BLD AUTO: 44.1 % (ref 37.5–51)
HGB BLD-MCNC: 14.3 G/DL (ref 13–17.7)
HIV 1+2 AB+HIV1 P24 AG SERPL QL IA: NON REACTIVE
IMM GRANULOCYTES # BLD AUTO: 0 X10E3/UL (ref 0–0.1)
IMM GRANULOCYTES NFR BLD AUTO: 0 %
LYMPHOCYTES # BLD AUTO: 1.2 X10E3/UL (ref 0.7–3.1)
LYMPHOCYTES NFR BLD AUTO: 27 %
MCH RBC QN AUTO: 26.1 PG (ref 26.6–33)
MCHC RBC AUTO-ENTMCNC: 32.4 G/DL (ref 31.5–35.7)
MCV RBC AUTO: 81 FL (ref 79–97)
MONOCYTES # BLD AUTO: 0.3 X10E3/UL (ref 0.1–0.9)
MONOCYTES NFR BLD AUTO: 6 %
NEUTROPHILS # BLD AUTO: 2.8 X10E3/UL (ref 1.4–7)
NEUTROPHILS NFR BLD AUTO: 64 %
PLATELET # BLD AUTO: 446 X10E3/UL (ref 150–450)
R RICKETTSI IGG SER QL IA: NEGATIVE
R RICKETTSI IGM SER-ACNC: 1.57 INDEX (ref 0–0.89)
RBC # BLD AUTO: 5.48 X10E6/UL (ref 4.14–5.8)
RPR SER QL: NON REACTIVE
WBC # BLD AUTO: 4.4 X10E3/UL (ref 3.4–10.8)

## 2023-10-10 NOTE — TELEPHONE ENCOUNTER
JHONATAN MONTEIRO CALLED TO CONFIRM DISABILTY PAPERWORK WAS RECEIVED.      PLEASE CALL [phone]. SHE MAY REFAX PAPERWORK

## 2023-10-10 NOTE — TELEPHONE ENCOUNTER
I called and spoke with someone and let them know that we did have the paper work and I was just waiting for it to be completed by Dr Church to send in. They advised it has to be sent in and they have to have it by October 17th, 2023.

## 2023-10-11 ENCOUNTER — PATIENT MESSAGE (OUTPATIENT)
Dept: FAMILY MEDICINE CLINIC | Facility: CLINIC | Age: 53
End: 2023-10-11
Payer: COMMERCIAL

## 2023-10-16 ENCOUNTER — OFFICE VISIT (OUTPATIENT)
Dept: FAMILY MEDICINE CLINIC | Facility: CLINIC | Age: 53
End: 2023-10-16
Payer: COMMERCIAL

## 2023-10-16 VITALS
RESPIRATION RATE: 18 BRPM | BODY MASS INDEX: 19.65 KG/M2 | SYSTOLIC BLOOD PRESSURE: 122 MMHG | TEMPERATURE: 97.7 F | DIASTOLIC BLOOD PRESSURE: 78 MMHG | WEIGHT: 125.2 LBS | OXYGEN SATURATION: 97 % | HEIGHT: 67 IN | HEART RATE: 91 BPM

## 2023-10-16 DIAGNOSIS — M25.50 ARTHRALGIA OF MULTIPLE JOINTS: ICD-10-CM

## 2023-10-16 DIAGNOSIS — R50.9 FEVER IN ADULT: ICD-10-CM

## 2023-10-16 DIAGNOSIS — R10.9 FLANK PAIN: Primary | ICD-10-CM

## 2023-10-16 DIAGNOSIS — R42 DIZZINESS: ICD-10-CM

## 2023-10-16 DIAGNOSIS — R07.89 CHEST DISCOMFORT: ICD-10-CM

## 2023-10-16 DIAGNOSIS — R06.02 SOB (SHORTNESS OF BREATH): ICD-10-CM

## 2023-10-16 LAB
BILIRUB BLD-MCNC: NEGATIVE MG/DL
CLARITY, POC: CLEAR
COLOR UR: YELLOW
EXPIRATION DATE: NORMAL
GLUCOSE UR STRIP-MCNC: NEGATIVE MG/DL
KETONES UR QL: NEGATIVE
LEUKOCYTE EST, POC: NEGATIVE
Lab: NORMAL
NITRITE UR-MCNC: NEGATIVE MG/ML
PH UR: 6 [PH] (ref 5–8)
PROT UR STRIP-MCNC: NEGATIVE MG/DL
RBC # UR STRIP: NEGATIVE /UL
SP GR UR: 1.01 (ref 1–1.03)
UROBILINOGEN UR QL: NORMAL

## 2023-10-16 PROCEDURE — 99214 OFFICE O/P EST MOD 30 MIN: CPT | Performed by: FAMILY MEDICINE

## 2023-10-16 PROCEDURE — 81003 URINALYSIS AUTO W/O SCOPE: CPT | Performed by: FAMILY MEDICINE

## 2023-10-16 PROCEDURE — 93000 ELECTROCARDIOGRAM COMPLETE: CPT | Performed by: FAMILY MEDICINE

## 2023-10-16 NOTE — PROGRESS NOTES
"Chief Complaint  still having sick symptoms feels weak, dizzy, back pain; Headache; feels confused,; and Pain (Neck and Back)    Subjective        Skyler Huerta presents to [facility]  Headache  Pain  Associated symptoms include headaches.     Pt presents today for follow up of body aches and not feeling well and was recently on abx.  Fevers and chills.  This has been going on for over a month.  He has had labs and still no improvement.    Now he is having dizzy spells and some achyness all over.  Some flank pain as well.     10/423 was started on doxy and had labs done.            9/20/2023- Went to the urgent care and they gave him antibiotics and took blood. He had vomiting, fever, Swets and cold chills, stiffness in neck and back, confusion, light sensitivity.      9/34/2023- He went to the ER because he wasn't getting better      9/30/2023- He went to  ER because he had an issue with his ankle     He is not having fevers anymore but is having stiffness, cold chills, dizziness, weakness, fatigue, light sensitivity     Objective   Vital Signs:  /78 (BP Location: Left arm, Patient Position: Sitting, Cuff Size: Adult)   Pulse 91   Temp 97.7 °F (36.5 °C) (Tympanic)   Resp 18   Ht 170.2 cm (67.01\")   Wt 56.8 kg (125 lb 3.2 oz)   SpO2 97%   BMI 19.60 kg/m²   Estimated body mass index is 19.6 kg/m² as calculated from the following:    Height as of this encounter: 170.2 cm (67.01\").    Weight as of this encounter: 56.8 kg (125 lb 3.2 oz).       BMI is within normal parameters. No other follow-up for BMI required.      Physical Exam  Vitals and nursing note reviewed.   Constitutional:       Appearance: Normal appearance. He is well-developed.   Cardiovascular:      Rate and Rhythm: Normal rate and regular rhythm.      Heart sounds: Normal heart sounds. No murmur heard.  Pulmonary:      Effort: Pulmonary effort is normal. No respiratory distress.      Breath sounds: Normal " breath sounds. No stridor. No wheezing or rhonchi.   Neurological:      General: No focal deficit present.      Mental Status: He is alert and oriented to person, place, and time. He is not disoriented.   Psychiatric:         Mood and Affect: Mood normal.         Behavior: Behavior normal.        Result Review :              ECG 12 Lead    Date/Time: 10/16/2023 3:22 PM  Performed by: Alysha Molina MD    Authorized by: Alysha Molina MD  Comparison: not compared with previous ECG   Rhythm: sinus rhythm  Rate: normal  Conduction: conduction normal  ST Segments: ST segments normal  T Waves: T waves normal  QRS axis: normal  Other: no other findings    Clinical impression: normal ECG            Assessment and Plan   Diagnoses and all orders for this visit:    1. Flank pain (Primary)  -     POC Urinalysis Dipstick, Automated  -     Cancel: CBC & Differential  -     Cancel: Comprehensive Metabolic Panel  -     CBC & Differential  -     Comprehensive Metabolic Panel  -     Urine Culture - Urine, Urine, Clean Catch    2. Dizziness  -     Hemoglobin A1c    3. Fever in adult  -     POC Urinalysis Dipstick, Automated  -     Cancel: CBC & Differential  -     Cancel: Comprehensive Metabolic Panel  -     CBC & Differential  -     Comprehensive Metabolic Panel    4. Chest discomfort  -     ECG 12 Lead    5. SOB (shortness of breath)  -     ECG 12 Lead    6. Arthralgia of multiple joints  -     MUSA by IFA, Reflex 9-biomarkers profile  -     C-reactive Protein  -     Rheumatoid Factor  -     Sedimentation Rate  -     Uric Acid  -     CBC & Differential  -     TSH Rfx On Abnormal To Free T4             Follow Up   No follow-ups on file.  Patient was given instructions and counseling regarding his condition or for health maintenance advice. Please see specific information pulled into the AVS if appropriate.   We will get an autoimmune panel.  EKG was normal.  UA was normal but we will get a urine culture.    Will get  additional labs and will recheck urine and cbc.    Answers submitted by the patient for this visit:  Primary Reason for Visit (Submitted on 10/16/2023)  What is the primary reason for your visit?: Other  Other (Submitted on 10/16/2023)  Please describe your symptoms.: Dr Church if my conditions didn't get any better after taking antibiotics., Experiencing back pain, dizziness, shortness of breath, feeling weak, weight loss, sensitive to light,, Spikes in bpm, stiff neck  Have you had these symptoms before?: Yes  How long have you been having these symptoms?: Greater than 2 weeks  Please describe any probable cause for these symptoms. : Stomach virus, headaches, stiff neck, fever

## 2023-10-18 PROBLEM — R73.03 PREDIABETES: Status: ACTIVE | Noted: 2023-10-18

## 2023-10-18 LAB
ALBUMIN SERPL-MCNC: 5 G/DL (ref 3.8–4.9)
ALBUMIN/GLOB SERPL: 1.9 {RATIO} (ref 1.2–2.2)
ALP SERPL-CCNC: 77 IU/L (ref 44–121)
ALT SERPL-CCNC: 29 IU/L (ref 0–44)
ANA SER QL IF: NEGATIVE
AST SERPL-CCNC: 29 IU/L (ref 0–40)
BACTERIA UR CULT: NORMAL
BACTERIA UR CULT: NORMAL
BASOPHILS # BLD AUTO: 0 X10E3/UL (ref 0–0.2)
BASOPHILS NFR BLD AUTO: 0 %
BILIRUB SERPL-MCNC: 0.4 MG/DL (ref 0–1.2)
BUN SERPL-MCNC: 9 MG/DL (ref 6–24)
BUN/CREAT SERPL: 8 (ref 9–20)
CALCIUM SERPL-MCNC: 10 MG/DL (ref 8.7–10.2)
CHLORIDE SERPL-SCNC: 100 MMOL/L (ref 96–106)
CO2 SERPL-SCNC: 26 MMOL/L (ref 20–29)
CREAT SERPL-MCNC: 1.1 MG/DL (ref 0.76–1.27)
CRP SERPL-MCNC: 1 MG/L (ref 0–10)
EGFRCR SERPLBLD CKD-EPI 2021: 80 ML/MIN/1.73
EOSINOPHIL # BLD AUTO: 0.1 X10E3/UL (ref 0–0.4)
EOSINOPHIL NFR BLD AUTO: 2 %
ERYTHROCYTE [DISTWIDTH] IN BLOOD BY AUTOMATED COUNT: 14.4 % (ref 11.6–15.4)
ERYTHROCYTE [SEDIMENTATION RATE] IN BLOOD BY WESTERGREN METHOD: 9 MM/HR (ref 0–30)
GLOBULIN SER CALC-MCNC: 2.6 G/DL (ref 1.5–4.5)
GLUCOSE SERPL-MCNC: 70 MG/DL (ref 70–99)
HBA1C MFR BLD: 5.8 % (ref 4.8–5.6)
HCT VFR BLD AUTO: 42.3 % (ref 37.5–51)
HGB BLD-MCNC: 13.6 G/DL (ref 13–17.7)
IMM GRANULOCYTES # BLD AUTO: 0 X10E3/UL (ref 0–0.1)
IMM GRANULOCYTES NFR BLD AUTO: 0 %
LABORATORY COMMENT REPORT: NORMAL
LYMPHOCYTES # BLD AUTO: 1.3 X10E3/UL (ref 0.7–3.1)
LYMPHOCYTES NFR BLD AUTO: 19 %
MCH RBC QN AUTO: 26.2 PG (ref 26.6–33)
MCHC RBC AUTO-ENTMCNC: 32.2 G/DL (ref 31.5–35.7)
MCV RBC AUTO: 82 FL (ref 79–97)
MONOCYTES # BLD AUTO: 0.4 X10E3/UL (ref 0.1–0.9)
MONOCYTES NFR BLD AUTO: 6 %
NEUTROPHILS # BLD AUTO: 4.9 X10E3/UL (ref 1.4–7)
NEUTROPHILS NFR BLD AUTO: 73 %
PLATELET # BLD AUTO: 293 X10E3/UL (ref 150–450)
POTASSIUM SERPL-SCNC: 4.3 MMOL/L (ref 3.5–5.2)
PROT SERPL-MCNC: 7.6 G/DL (ref 6–8.5)
RBC # BLD AUTO: 5.19 X10E6/UL (ref 4.14–5.8)
RHEUMATOID FACT SERPL-ACNC: 10.6 IU/ML
SODIUM SERPL-SCNC: 140 MMOL/L (ref 134–144)
TSH SERPL DL<=0.005 MIU/L-ACNC: 0.71 UIU/ML (ref 0.45–4.5)
URATE SERPL-MCNC: 4.9 MG/DL (ref 3.8–8.4)
WBC # BLD AUTO: 6.7 X10E3/UL (ref 3.4–10.8)

## 2023-10-24 ENCOUNTER — OFFICE VISIT (OUTPATIENT)
Dept: FAMILY MEDICINE CLINIC | Facility: CLINIC | Age: 53
End: 2023-10-24
Payer: COMMERCIAL

## 2023-10-24 VITALS
OXYGEN SATURATION: 100 % | SYSTOLIC BLOOD PRESSURE: 150 MMHG | TEMPERATURE: 99.8 F | WEIGHT: 125 LBS | DIASTOLIC BLOOD PRESSURE: 76 MMHG | HEART RATE: 98 BPM | BODY MASS INDEX: 19.57 KG/M2

## 2023-10-24 DIAGNOSIS — R06.02 SHORTNESS OF BREATH: ICD-10-CM

## 2023-10-24 DIAGNOSIS — R50.9 FEVER IN ADULT: ICD-10-CM

## 2023-10-24 DIAGNOSIS — R42 DIZZINESS: Primary | ICD-10-CM

## 2023-10-24 LAB
EXPIRATION DATE: NORMAL
EXPIRATION DATE: NORMAL
FLUAV AG UPPER RESP QL IA.RAPID: NOT DETECTED
FLUBV AG UPPER RESP QL IA.RAPID: NOT DETECTED
INTERNAL CONTROL: NORMAL
INTERNAL CONTROL: NORMAL
Lab: NORMAL
Lab: NORMAL
S PYO AG THROAT QL: NEGATIVE
SARS-COV-2 AG UPPER RESP QL IA.RAPID: NOT DETECTED

## 2023-10-24 PROCEDURE — 99214 OFFICE O/P EST MOD 30 MIN: CPT | Performed by: INTERNAL MEDICINE

## 2023-10-24 PROCEDURE — 87428 SARSCOV & INF VIR A&B AG IA: CPT | Performed by: INTERNAL MEDICINE

## 2023-10-24 PROCEDURE — 87880 STREP A ASSAY W/OPTIC: CPT | Performed by: INTERNAL MEDICINE

## 2023-10-24 RX ORDER — MELOXICAM 15 MG/1
TABLET ORAL
COMMUNITY
Start: 2023-10-23

## 2023-10-24 NOTE — PROGRESS NOTES
Subjective   Skyler Huerta is a 53 y.o. male.     Chief Complaint   Patient presents with    Fever     Night sweats, shortness of breath, dizziness, joint pains       History of Present Illness   Patient with fever 102.8 last night, chills, night sweats, short of breath, dizzy when starts to feel weak, and back/joint pains.  Has been having issues for the last 6 weeks.  Was improving but now symptoms returned.  No nausea, vomiting, rash, syncope, vision changes, or other problems.   Had negative STD panel/urine Cx/thyroid/arthritis panel/tick panel/CBC/tick panel/CXR  that was normal.    The following portions of the patient's history were reviewed and updated as appropriate: allergies, current medications, past family history, past medical history, past social history, past surgical history and problem list.    Depression Screen:      5/15/2023     9:50 AM   PHQ-2/PHQ-9 Depression Screening   Little Interest or Pleasure in Doing Things 0-->not at all   Feeling Down, Depressed or Hopeless 0-->not at all   PHQ-9: Brief Depression Severity Measure Score 0       Past Medical History:   Diagnosis Date    Acute frontal sinusitis     Colon polyp 2021    Removed    Diabetes mellitus 10/19/2023    Prediabetes.    HL (hearing loss) 1993    Left ear       Past Surgical History:   Procedure Laterality Date    COLONOSCOPY  2021    VASECTOMY  2005       Family History   Problem Relation Age of Onset    No Known Problems Mother     No Known Problems Father     Diabetes Maternal Grandfather         Diabetic    Heart disease Paternal Grandfather         Stents    Other Paternal Aunt         Sarcoidosis       Social History     Socioeconomic History    Marital status:    Tobacco Use    Smoking status: Never    Smokeless tobacco: Never   Substance and Sexual Activity    Alcohol use: Not Currently     Alcohol/week: 14.0 standard drinks of alcohol     Types: 12 Cans of beer, 2 Drinks containing 0.5 oz of alcohol per week     Drug use: Not Currently     Frequency: 2.0 times per week     Types: Marijuana    Sexual activity: Yes     Partners: Female     Birth control/protection: Vasectomy       Current Outpatient Medications   Medication Sig Dispense Refill    cyclobenzaprine (FLEXERIL) 10 MG tablet Take 1 tablet by mouth.      diclofenac (VOLTAREN) 75 MG EC tablet Take 1 tablet by mouth.      doxycycline (VIBRAMYCIN) 100 MG capsule Take 1 capsule by mouth 2 (Two) Times a Day. 20 capsule 0    meloxicam (MOBIC) 15 MG tablet       ondansetron ODT (ZOFRAN-ODT) 4 MG disintegrating tablet Place 1 tablet on the tongue Every 8 (Eight) Hours As Needed for Nausea or Vomiting. 20 tablet 0     No current facility-administered medications for this visit.       Review of Systems   Constitutional:  Positive for chills, fatigue and fever. Negative for activity change, appetite change, unexpected weight gain and unexpected weight loss.        Night sweats   HENT:  Negative for nosebleeds, rhinorrhea, trouble swallowing and voice change.    Eyes:  Negative for visual disturbance.   Respiratory:  Positive for shortness of breath. Negative for cough, chest tightness and wheezing.    Cardiovascular:  Negative for chest pain, palpitations and leg swelling.   Gastrointestinal:  Negative for abdominal pain, blood in stool, constipation, diarrhea, nausea, vomiting, GERD and indigestion.   Genitourinary:  Negative for dysuria, frequency and hematuria.   Musculoskeletal:  Negative for arthralgias, back pain and myalgias.   Skin:  Negative for rash and wound.   Neurological:  Positive for dizziness. Negative for tremors, weakness, light-headedness, numbness, headache and memory problem.   Hematological:  Negative for adenopathy. Does not bruise/bleed easily.   Psychiatric/Behavioral:  Negative for sleep disturbance and depressed mood. The patient is not nervous/anxious.        Objective   /76 (BP Location: Right arm, Patient Position: Sitting, Cuff Size:  Adult)   Pulse 98   Temp 99.8 °F (37.7 °C)   Wt 56.7 kg (125 lb)   SpO2 100%   BMI 19.57 kg/m²     Physical Exam  Vitals and nursing note reviewed.   Constitutional:       General: He is not in acute distress.     Appearance: He is well-developed. He is not diaphoretic.   HENT:      Head: Normocephalic and atraumatic.      Right Ear: External ear normal.      Left Ear: External ear normal.      Nose: Nose normal.   Eyes:      Conjunctiva/sclera: Conjunctivae normal.      Pupils: Pupils are equal, round, and reactive to light.   Neck:      Thyroid: No thyromegaly.      Trachea: No tracheal deviation.   Cardiovascular:      Rate and Rhythm: Normal rate and regular rhythm.      Heart sounds: Normal heart sounds. No murmur heard.     No friction rub. No gallop.   Pulmonary:      Effort: Pulmonary effort is normal. No respiratory distress.      Breath sounds: Normal breath sounds.   Abdominal:      General: Bowel sounds are normal.      Palpations: Abdomen is soft. There is no mass.      Tenderness: There is no abdominal tenderness. There is no guarding.   Musculoskeletal:         General: Normal range of motion.      Cervical back: Normal range of motion and neck supple.   Lymphadenopathy:      Cervical: No cervical adenopathy.   Skin:     General: Skin is warm and dry.      Capillary Refill: Capillary refill takes less than 2 seconds.      Findings: No rash.   Neurological:      Mental Status: He is alert and oriented to person, place, and time.      Motor: No abnormal muscle tone.      Deep Tendon Reflexes: Reflexes normal.   Psychiatric:         Behavior: Behavior normal.         Thought Content: Thought content normal.         Judgment: Judgment normal.         Recent Results (from the past 2016 hour(s))   CBC AND DIFFERENTIAL    Collection Time: 09/24/23  7:34 AM    Specimen type and source: Whole Blood, Blood   Result Value Ref Range    WBC 5.54 4.5 - 11.0 10*3/uL    RBC 4.56 4.5 - 5.9 10*6/uL    Hemoglobin  11.8 (L) 13.5 - 17.5 g/dL    Hematocrit 36.0 (L) 41.0 - 53.0 %    MCV 78.9 (L) 80.0 - 100.0 fL    MCH 25.9 (L) 26.0 - 34.0 pg    MCHC 32.8 31.0 - 37.0 g/dL    RDW 14.9 12.0 - 16.8 %    Platelets 239 140 - 440 10*3/uL    MPV 9.6 8.4 - 12.4 fL    Differential [facility] CBC w/AutoDiff (arb'U)    Neutrophil Rel % 66.0 45 - 80 %    Lymphocyte Rel % 17.3 15 - 50 %    Monocyte Rel % 14.4 0 - 15 %    Eosinophil % 1.1 0 - 7 %    Basophil Rel % 0.5 0 - 2 %    Immature Grans % 0.7 0.0 - 1.0 %    nRBC 0 0 /100(WBC)    Neutrophils Absolute 3.65 2.0 - 8.8 10*3/uL    Lymphocytes Absolute 0.96 0.7 - 5.5 10*3/uL    Monocytes Absolute 0.80 0.0 - 1.7 10*3/uL    Eosinophils Absolute 0.06 0.0 - 0.8 10*3/uL    Basophils Absolute 0.03 0.0 - 0.2 10*3/uL    Immature Grans, Absolute 0.04 0.00 - 0.10 10*3/uL   Comprehensive metabolic panel    Collection Time: 09/26/23  2:32 PM    Specimen: Blood   Result Value Ref Range    Glucose 92 70 - 99 mg/dL    BUN 8 6 - 24 mg/dL    Creatinine 0.99 0.76 - 1.27 mg/dL    EGFR Result 91 >59 mL/min/1.73    BUN/Creatinine Ratio 8 (L) 9 - 20    Sodium 140 134 - 144 mmol/L    Potassium 4.8 3.5 - 5.2 mmol/L    Chloride 99 96 - 106 mmol/L    Total CO2 23 20 - 29 mmol/L    Calcium 9.6 8.7 - 10.2 mg/dL    Total Protein 7.8 6.0 - 8.5 g/dL    Albumin 4.8 3.8 - 4.9 g/dL    Globulin 3.0 1.5 - 4.5 g/dL    A/G Ratio 1.6 1.2 - 2.2    Total Bilirubin 0.2 0.0 - 1.2 mg/dL    Alkaline Phosphatase 75 44 - 121 IU/L    AST (SGOT) 31 0 - 40 IU/L    ALT (SGPT) 54 (H) 0 - 44 IU/L   CBC w AUTO Differential    Collection Time: 09/26/23  2:32 PM    Specimen: Blood   Result Value Ref Range    WBC 4.9 3.4 - 10.8 x10E3/uL    RBC 5.44 4.14 - 5.80 x10E6/uL    Hemoglobin 14.2 13.0 - 17.7 g/dL    Hematocrit 44.6 37.5 - 51.0 %    MCV 82 79 - 97 fL    MCH 26.1 (L) 26.6 - 33.0 pg    MCHC 31.8 31.5 - 35.7 g/dL    RDW 14.7 11.6 - 15.4 %    Platelets 374 150 - 450 x10E3/uL    Neutrophil Rel % 56 Not Estab. %    Lymphocyte Rel % 28 Not Estab.  %    Monocyte Rel % 12 Not Estab. %    Eosinophil Rel % 1 Not Estab. %    Basophil Rel % 1 Not Estab. %    Neutrophils Absolute 2.8 1.4 - 7.0 x10E3/uL    Lymphocytes Absolute 1.4 0.7 - 3.1 x10E3/uL    Monocytes Absolute 0.6 0.1 - 0.9 x10E3/uL    Eosinophils Absolute 0.1 0.0 - 0.4 x10E3/uL    Basophils Absolute 0.1 0.0 - 0.2 x10E3/uL    Immature Granulocyte Rel % 2 Not Estab. %    Immature Grans Absolute 0.1 0.0 - 0.1 x10E3/uL   Sedimentation Rate    Collection Time: 09/26/23  2:32 PM    Specimen: Blood   Result Value Ref Range    Sed Rate 20 0 - 30 mm/hr   C-reactive protein    Collection Time: 09/26/23  2:32 PM    Specimen: Blood   Result Value Ref Range    C-Reactive Protein 43 (H) 0 - 10 mg/L   Procalcitonin    Collection Time: 09/26/23  2:32 PM    Specimen: Blood   Result Value Ref Range    Procalcitonin 0.13 (H) 0.00 - 0.08 ng/mL   Hepatitis panel, acute    Collection Time: 09/26/23  2:32 PM    Specimen: Blood   Result Value Ref Range    Hep A IgM Negative Negative    Hepatitis B Surface Ag Negative Negative    Hep B Core IgM Negative Negative    Hepatitis C Ab Non Reactive Non Reactive   Interpretation:    Collection Time: 09/26/23  2:32 PM   Result Value Ref Range    Interpretation Comment    CBC & Differential    Collection Time: 10/04/23 12:46 PM    Specimen: Blood   Result Value Ref Range    WBC 4.4 3.4 - 10.8 x10E3/uL    RBC 5.48 4.14 - 5.80 x10E6/uL    Hemoglobin 14.3 13.0 - 17.7 g/dL    Hematocrit 44.1 37.5 - 51.0 %    MCV 81 79 - 97 fL    MCH 26.1 (L) 26.6 - 33.0 pg    MCHC 32.4 31.5 - 35.7 g/dL    RDW 14.8 11.6 - 15.4 %    Platelets 446 150 - 450 x10E3/uL    Neutrophil Rel % 64 Not Estab. %    Lymphocyte Rel % 27 Not Estab. %    Monocyte Rel % 6 Not Estab. %    Eosinophil Rel % 2 Not Estab. %    Basophil Rel % 1 Not Estab. %    Neutrophils Absolute 2.8 1.4 - 7.0 x10E3/uL    Lymphocytes Absolute 1.2 0.7 - 3.1 x10E3/uL    Monocytes Absolute 0.3 0.1 - 0.9 x10E3/uL    Eosinophils Absolute 0.1 0.0 - 0.4  x10E3/uL    Basophils Absolute 0.0 0.0 - 0.2 x10E3/uL    Immature Granulocyte Rel % 0 Not Estab. %    Immature Grans Absolute 0.0 0.0 - 0.1 x10E3/uL   Lyme Disease Total Antibody With Reflex to Immunoassay    Collection Time: 10/04/23 12:46 PM    Specimen: Blood   Result Value Ref Range    Lyme Total Antibody EIA Negative Negative   [facility] (IgG / M)    Collection Time: 10/04/23 12:46 PM    Specimen: Blood   Result Value Ref Range    RMSF IgG Negative Negative    RMSF IgM 1.57 (H) 0.00 - 0.89 index   HIV-1 / O / 2 Ag / Antibody    Collection Time: 10/04/23 12:46 PM    Specimen: Blood   Result Value Ref Range    HIV Screen 4th Gen w/RFX (Reference) Non Reactive Non Reactive   RPR    Collection Time: 10/04/23 12:46 PM    Specimen: Blood   Result Value Ref Range    RPR Non Reactive Non Reactive   Chlamydia trachomatis, Neisseria gonorrhoeae, PCR - Urine, Urine, Clean Catch    Collection Time: 10/04/23  1:06 PM    Specimen: Urine, Clean Catch    UC   Result Value Ref Range    Chlamydia trachomatis, MARCELLA Negative Negative    Neisseria gonorrhoeae, MARCELLA Negative Negative   POC Urinalysis Dipstick, Automated    Collection Time: 10/16/23  3:51 PM    Specimen: Urine   Result Value Ref Range    Color Yellow Yellow, Straw, Dark Yellow, Nery    Clarity, UA Clear Clear    Specific Gravity  1.015 1.005 - 1.030    pH, Urine 6.0 5.0 - 8.0    Leukocytes Negative Negative    Nitrite, UA Negative Negative    Protein, POC Negative Negative mg/dL    Glucose, UA Negative Negative mg/dL    Ketones, UA Negative Negative    Urobilinogen, UA 0.2 E.U./dL Normal, 0.2 E.U./dL    Bilirubin Negative Negative    Blood, UA Negative Negative    Lot Number [id number]     Expiration Date 3,821,024    CBC & Differential    Collection Time: 10/16/23  4:05 PM    Specimen: Blood   Result Value Ref Range    WBC 6.7 3.4 - 10.8 x10E3/uL    RBC 5.19 4.14 - 5.80 x10E6/uL    Hemoglobin 13.6 13.0 - 17.7 g/dL    Hematocrit 42.3 37.5 - 51.0 %    MCV  82 79 - 97 fL    MCH 26.2 (L) 26.6 - 33.0 pg    MCHC 32.2 31.5 - 35.7 g/dL    RDW 14.4 11.6 - 15.4 %    Platelets 293 150 - 450 x10E3/uL    Neutrophil Rel % 73 Not Estab. %    Lymphocyte Rel % 19 Not Estab. %    Monocyte Rel % 6 Not Estab. %    Eosinophil Rel % 2 Not Estab. %    Basophil Rel % 0 Not Estab. %    Neutrophils Absolute 4.9 1.4 - 7.0 x10E3/uL    Lymphocytes Absolute 1.3 0.7 - 3.1 x10E3/uL    Monocytes Absolute 0.4 0.1 - 0.9 x10E3/uL    Eosinophils Absolute 0.1 0.0 - 0.4 x10E3/uL    Basophils Absolute 0.0 0.0 - 0.2 x10E3/uL    Immature Granulocyte Rel % 0 Not Estab. %    Immature Grans Absolute 0.0 0.0 - 0.1 x10E3/uL   Comprehensive Metabolic Panel    Collection Time: 10/16/23  4:05 PM    Specimen: Blood   Result Value Ref Range    Glucose 70 70 - 99 mg/dL    BUN 9 6 - 24 mg/dL    Creatinine 1.10 0.76 - 1.27 mg/dL    EGFR Result 80 >59 mL/min/1.73    BUN/Creatinine Ratio 8 (L) 9 - 20    Sodium 140 134 - 144 mmol/L    Potassium 4.3 3.5 - 5.2 mmol/L    Chloride 100 96 - 106 mmol/L    Total CO2 26 20 - 29 mmol/L    Calcium 10.0 8.7 - 10.2 mg/dL    Total Protein 7.6 6.0 - 8.5 g/dL    Albumin 5.0 (H) 3.8 - 4.9 g/dL    Globulin 2.6 1.5 - 4.5 g/dL    A/G Ratio 1.9 1.2 - 2.2    Total Bilirubin 0.4 0.0 - 1.2 mg/dL    Alkaline Phosphatase 77 44 - 121 IU/L    AST (SGOT) 29 0 - 40 IU/L    ALT (SGPT) 29 0 - 44 IU/L   Hemoglobin A1c    Collection Time: 10/16/23  4:05 PM    Specimen: Blood   Result Value Ref Range    Hemoglobin A1C 5.8 (H) 4.8 - 5.6 %   MUSA by IFA, Reflex 9-biomarkers profile    Collection Time: 10/16/23  4:05 PM    Specimen: Blood   Result Value Ref Range    MUSA Negative     Please note Comment    C-reactive Protein    Collection Time: 10/16/23  4:05 PM    Specimen: Blood   Result Value Ref Range    C-Reactive Protein 1 0 - 10 mg/L   Rheumatoid Factor    Collection Time: 10/16/23  4:05 PM    Specimen: Blood   Result Value Ref Range    RA Latex Turbid 10.6 <14.0 IU/mL   Sedimentation Rate    Collection  Time: 10/16/23  4:05 PM    Specimen: Blood   Result Value Ref Range    Sed Rate 9 0 - 30 mm/hr   Uric Acid    Collection Time: 10/16/23  4:05 PM    Specimen: Blood   Result Value Ref Range    Uric Acid 4.9 3.8 - 8.4 mg/dL   TSH Rfx On Abnormal To Free T4    Collection Time: 10/16/23  4:05 PM    Specimen: Blood   Result Value Ref Range    TSH 0.715 0.450 - 4.500 uIU/mL   Urine Culture - ,    Collection Time: 10/16/23  4:35 PM    UR   Result Value Ref Range    Urine Culture Final report     Result 1 Comment    POCT SARS-CoV-2 Antigen VANESSA + Flu    Collection Time: 10/24/23  3:45 PM    Specimen: Swab   Result Value Ref Range    SARS Antigen Not Detected Not Detected, Presumptive Negative    Influenza A Antigen VANESSA Not Detected Not Detected    Influenza B Antigen VANESSA Not Detected Not Detected    Internal Control Passed Passed    Lot Number [id number]     Expiration Date 10/10/2024    POCT rapid strep A    Collection Time: 10/24/23  3:46 PM    Specimen: Swab   Result Value Ref Range    Rapid Strep A Screen Negative Negative, VALID, INVALID, Not Performed    Internal Control Passed Passed    Lot Number [id number]     Expiration Date 03/30/2026      Assessment & Plan   Diagnoses and all orders for this visit:    1. Dizziness (Primary)  -     CT Chest Without Contrast; Future    2. Fever in adult  -     POCT SARS-CoV-2 Antigen VANESSA + Flu  -     POCT rapid strep A  -     CT Chest Without Contrast; Future    3. Shortness of breath  -     CT Chest Without Contrast; Future    Patient reviewed with the patient his history and all of his labs and testing that is already occurred.  Continues with his symptoms of fever and dizziness with shortness of breath now.  Will obtain CT of the chest.  If he has persisting problems then we may consider referral to infectious disease.           COVID-19 Precautions - Patient was compliant in wearing a mask. When I saw the patient, I used appropriate personal protective equipment (PPE) including  mask and eye shield (standard procedure).  Additionally, I used gown and gloves if indicated.  Hand hygiene was completed before and after seeing the patient.  Dictated utilizing Dragon Dictation     Answers submitted by the patient for this visit:  Primary Reason for Visit (Submitted on 10/20/2023)  What is the primary reason for your visit?: Other  Other (Submitted on 10/20/2023)  Please describe your symptoms.: Dizziness, lightheaded, stiff neck, severe night sweats, shortness of breath, weakness ,light sensitive, sore eyes, backaches, joint aches, stomach aches, confusion, dry mouth, irritability, fatigue, tremors, frequent urination, all symptoms come and go.  Have you had these symptoms before?: Yes  How long have you been having these symptoms?: Greater than 2 weeks  Please list any medications you are currently taking for this condition.: Diclofenac sod 75mg  Please describe any probable cause for these symptoms. : Sarcoidosis

## 2023-11-15 ENCOUNTER — HOSPITAL ENCOUNTER (OUTPATIENT)
Dept: CT IMAGING | Facility: HOSPITAL | Age: 53
Discharge: HOME | End: 2023-11-15
Admitting: INTERNAL MEDICINE
Payer: COMMERCIAL

## 2023-11-15 DIAGNOSIS — R06.02 SHORTNESS OF BREATH: ICD-10-CM

## 2023-11-15 DIAGNOSIS — R50.9 FEVER IN ADULT: ICD-10-CM

## 2023-11-15 DIAGNOSIS — R42 DIZZINESS: ICD-10-CM

## 2023-11-15 PROCEDURE — 71250 CT THORAX DX C-: CPT

## 2023-11-16 ENCOUNTER — TELEPHONE (OUTPATIENT)
Dept: FAMILY MEDICINE CLINIC | Facility: CLINIC | Age: 53
End: 2023-11-16
Payer: COMMERCIAL

## 2023-11-16 DIAGNOSIS — R50.9 FUO (FEVER OF UNKNOWN ORIGIN): Primary | ICD-10-CM

## 2023-11-16 NOTE — TELEPHONE ENCOUNTER
Patient called to report that he has started itching in different areas of his body and his wife says there are bumps on his back. He is not sure if this is connected to his previous symptoms. Does Dr. Church want to move forward with a referral to Infectious Disease based on patient's CT scan results?    Patient would like contact by [facility] when a decision is reached

## 2023-11-20 ENCOUNTER — OFFICE VISIT (OUTPATIENT)
Dept: FAMILY MEDICINE CLINIC | Facility: CLINIC | Age: 53
End: 2023-11-20
Payer: COMMERCIAL

## 2023-11-20 VITALS
HEART RATE: 72 BPM | DIASTOLIC BLOOD PRESSURE: 80 MMHG | BODY MASS INDEX: 19.53 KG/M2 | RESPIRATION RATE: 18 BRPM | TEMPERATURE: 98 F | OXYGEN SATURATION: 99 % | HEIGHT: 67 IN | SYSTOLIC BLOOD PRESSURE: 122 MMHG | WEIGHT: 124.4 LBS

## 2023-11-20 DIAGNOSIS — R50.9 FEVER IN ADULT: Primary | ICD-10-CM

## 2023-11-20 DIAGNOSIS — R10.84 GENERALIZED ABDOMINAL PAIN: ICD-10-CM

## 2023-11-20 DIAGNOSIS — R42 DIZZINESS: ICD-10-CM

## 2023-11-20 PROCEDURE — 99213 OFFICE O/P EST LOW 20 MIN: CPT | Performed by: FAMILY MEDICINE

## 2023-11-20 RX ORDER — SULFAMETHOXAZOLE AND TRIMETHOPRIM 800; 160 MG/1; MG/1
TABLET ORAL
COMMUNITY
Start: 2023-11-13

## 2023-11-20 NOTE — PROGRESS NOTES
"Chief Complaint  Fatigue (Dizziness..same symptoms he has been experiencing for the last few months. )    Subjective      Pt presents today for FMLA for his fatigue, abd pains and body aches.        Skyler Huerta presents to [facility]  Fatigue  Associated symptoms include fatigue.       Objective   Vital Signs:  /80 (BP Location: Right arm, Patient Position: Sitting, Cuff Size: Adult)   Pulse 72   Temp 98 °F (36.7 °C) (Tympanic)   Resp 18   Ht 170.2 cm (67.01\")   Wt 56.4 kg (124 lb 6.4 oz)   SpO2 99%   BMI 19.48 kg/m²   Estimated body mass index is 19.48 kg/m² as calculated from the following:    Height as of this encounter: 170.2 cm (67.01\").    Weight as of this encounter: 56.4 kg (124 lb 6.4 oz).       BMI is within normal parameters. No other follow-up for BMI required.      Physical Exam  Vitals and nursing note reviewed.   Constitutional:       Appearance: Normal appearance. He is well-developed.   Cardiovascular:      Rate and Rhythm: Normal rate and regular rhythm.      Heart sounds: Normal heart sounds. No murmur heard.  Pulmonary:      Effort: Pulmonary effort is normal. No respiratory distress.      Breath sounds: Normal breath sounds. No stridor. No wheezing or rhonchi.   Neurological:      General: No focal deficit present.      Mental Status: He is alert and oriented to person, place, and time. He is not disoriented.   Psychiatric:         Mood and Affect: Mood normal.         Behavior: Behavior normal.        Result Review :                   Assessment and Plan   Diagnoses and all orders for this visit:    1. Fever in adult (Primary)    2. Dizziness    3. Generalized abdominal pain             Follow Up   No follow-ups on file.  Patient was given instructions and counseling regarding his condition or for health maintenance advice. Please see specific information pulled into the AVS if appropriate.       Follow up with ID as scheduled or will be " scheduled.    FMLA filled out.    Answers submitted by the patient for this visit:  Primary Reason for Visit (Submitted on 11/16/2023)  What is the primary reason for your visit?: Other  Other (Submitted on 11/16/2023)  Please describe your symptoms.: DIZZNESS, SLIGHT HEADACHES, STIFF NECK, SHORTNESS OF BREATH, FEVERISH, WEAK, FATIGUE,, ITCHING AND SCRATCHING IN DIFFERENT LOCATION OF BODY, NOT ABLE TO SLEEP  Have you had these symptoms before?: Yes  How long have you been having these symptoms?: Greater than 2 weeks

## 2023-12-11 ENCOUNTER — OFFICE VISIT (OUTPATIENT)
Dept: INFECTIOUS DISEASES | Facility: CLINIC | Age: 53
End: 2023-12-11
Payer: COMMERCIAL

## 2023-12-11 VITALS
SYSTOLIC BLOOD PRESSURE: 131 MMHG | BODY MASS INDEX: 21.04 KG/M2 | DIASTOLIC BLOOD PRESSURE: 82 MMHG | TEMPERATURE: 97.1 F | HEART RATE: 69 BPM | RESPIRATION RATE: 20 BRPM | WEIGHT: 134.4 LBS

## 2023-12-11 DIAGNOSIS — R50.9 FEVER, UNSPECIFIED FEVER CAUSE: Primary | ICD-10-CM

## 2023-12-11 NOTE — PROGRESS NOTES
Referring Provider: Palmer Church MD  [id number] [facility]  [address]  [place] [address]  Reason for clinic visits: Initial infectious disease clinic visit concerns for recurrent    HPI: Skyler Huerta is a 53 y.o. male who presents to the infectious disease clinic with above complaint    The patient originally presented to urgent care on September 20 with complaints of bodyaches runny nose fever with a Tmax of 101 vomiting and nausea for the prior 3 to 4 days.  Was diagnosed with a bacterial URI and prescribed a Z-Stan.  That time he tested negative for COVID and flu antigens.  He went back to the emergency room September 24 with worsening symptoms with a Tmax of 105.  Labs revealed a normal white blood cell count, normal creatinine slightly elevated AST of 39.  Chest x-ray was unremarkable.  Urine culture was negative and flu COVID and RSV PCR's were negative.  Back to the hospital on September 30 with complaints of right ankle pain and erythema.  He also started complaining of headache and neck stiffness.  X-ray of the ankle was unremarkable.  He was seen by his primary care provider on October 4.  At that time he was complaining of headache nausea hot flashes feeling weak and stiffness.  Repeat CBC was unremarkable.  He had HIV RPR [place] spotted fever Lyme disease and chlamydia/gonorrhea urine PCR's done all coming back negative.  Empirically treated with doxycycline 100 mg p.o. 3 x 10 days.  He is feeling better but started having recurrence.  Presented back to his primary care on October 24 with complaints of fever with a Tmax of 102.8 night sweats.  He was short of breath and dizzy with back and joint pains.  He had a rapid strep test and COVID and flu antigens which were negative.  A CT of the chest was ordered and was done in November without any acute abnormalities.    Currently the patient states he has been fever free for the past 3 weeks.  He denies any night sweats or chills.  He did  complete a course of Bactrim last week which [facility] concerns for [facility] tract infection.  He states he continues to have some lower quadrant abdominal pain that occurs about every other day and lasts about 5 to 10 minutes.  He states the pain increases with food intake.  He also reports diarrhea that occurs about once per week with 2-3 loose bowel movements that day.  He denies any blood.  He is scheduled to undergo an endoscopy in the next week or 2.  He needs to report profound fatigue and lightheadedness.  He does report daily headaches that last about 2 hours and resolve spontaneously.  He denies any sore throat or cough.  He does report itching mostly in his upper and lower extremities but states overall he has diffuse pruritus that occurs every day and lasts about 10 minutes.  He states his wife noticed a rash on his back    Patient works in the [employer].  He was in the [employer] and travel to [place] in [place] 30 years ago.  He was stationed in [place] and [place].  No unusual animal exposures    Past Medical History:   Diagnosis Date    Acute frontal sinusitis     Colon polyp 2021    Removed    Diabetes mellitus 10/19/2023    Prediabetes.    HL (hearing loss) 1993    Left ear       Past Surgical History:   Procedure Laterality Date    COLONOSCOPY  2021    VASECTOMY  2005       Social History   reports that he has never smoked. He has never used smokeless tobacco. He reports that he does not currently use alcohol after a past usage of about 14.0 standard drinks of alcohol per week. He reports that he does not currently use drugs after having used the following drugs: Marijuana. Frequency: 2.00 times per week.    Family History  family history includes Diabetes in his maternal grandfather; Heart disease in his paternal grandfather; No Known Problems in his father and mother; Other in his paternal aunt.    No Known Allergies    The medication list has been  reviewed and updated.     Review of Systems  Pertinent items are noted in HPI, all other systems reviewed and negative    Vital Signs   Vitals:    12/11/23 1007   BP: 131/82   Pulse: 69   Resp: 20   Temp: 97.1 °F (36.2 °C)        Physical Exam:   General: In no acute distress  Cardiovascular: Normal rate, regular rhythm, zafar S1 and S2, no murmurs, rubs, or gallops    Respiratory: Lungs are clear to ascultation bilaterally, no wheezing   GI: Abdomen is soft, non-tender, non-distended, positive bowel sounds bilaterally  Musculoskeletal: Normal range of motion, no edema, tenderness or deformity  Skin: Rash on back about 10-20 lesions  LE: no E/C/C  Neurological: Alert and oriented, moving all 4 extremities  Psychiatric: Normal mood and affect     Lab Results   Component Value Date    WBC 6.7 10/16/2023    HGB 13.6 10/16/2023    HCT 42.3 10/16/2023    MCV 82 10/16/2023     10/16/2023       Lab Results   Component Value Date    GLUCOSE 70 10/16/2023    BUN 9 10/16/2023    CREATININE 1.10 10/16/2023    EGFRIFNONA 67 06/10/2020    EGFRIFAFRI >60 09/21/2021    BCR 8 (L) 10/16/2023    CO2 26 10/16/2023    CALCIUM 10.0 10/16/2023    PROTENTOTREF 7.6 10/16/2023    ALBUMIN 5.0 (H) 10/16/2023    LABIL2 1.9 10/16/2023    AST 29 10/16/2023    ALT 29 10/16/2023       Lab Results   Component Value Date    SEDRATE 9 10/16/2023     10/16 UCx negative   10/4 urine gonorrhea chlamydia MARCELLA negative    10/24 but strep and negative  10/24 COVID antigen negative influenza antigen negative    10/16 rheumatoid factor negative  MUSA negative    10/4 RPR negative  HIV antigen negative    11/15 Chest CT no infiltrates or lymph nodes.  Mildly prominent axillary lymph nodes with normal morphology most likely reactive.  No pleural effusions no pneumothorax.    Assessment:  This is a 53 y.o. male who presents to clinic today for evaluation of fever.  Etiology of his symptoms is unclear to me at this time.  Initial autoimmune workup was  negative.  Extensive CBC CMP inflammatory marker testing was negative.  Chest CT is clear making a respiratory illness less likely.  He does have some GI symptoms but given postprandial increase in pain worried about possible gastritis versus ulceration.  He is scheduled for an EGD in the near future.  Wondering if his recurrent fever or simply various viral or bacterial illnesses occurring 1 after the other.  At this time I recommend monitoring for symptom recurrence.  I asked him to call the infectious disease clinic if his fevers recur at that time I would obtain a procalcitonin blood cultures x 2 and repeat routine blood work.  If his GI symptoms persist unexplained by the EGD a CAT scan of the abdomen pelvis can be pursued.  The patient does have recurrent headaches and I am wondering if he would benefit from neurology evaluation or at least imaging of his head.  Will leave that workup to the primary care discharge    Plan:   Patient will call the infectious disease clinic with recurrent fever at which time a workup will be pursued    Return to Infectious Disease clinic as needed    Time: More than 50% of time spent in counseling and coordination of care:  Total face-to-face/floor time 60 min.  Time spent in counseling 60 min. Counseling included the following topics: See assessment above

## 2023-12-15 ENCOUNTER — PATIENT ROUNDING (BHMG ONLY) (OUTPATIENT)
Dept: INFECTIOUS DISEASES | Facility: CLINIC | Age: 53
End: 2023-12-15
Payer: COMMERCIAL

## 2023-12-18 ENCOUNTER — OFFICE VISIT (OUTPATIENT)
Dept: FAMILY MEDICINE CLINIC | Facility: CLINIC | Age: 53
End: 2023-12-18
Payer: COMMERCIAL

## 2023-12-18 VITALS
HEIGHT: 67 IN | SYSTOLIC BLOOD PRESSURE: 130 MMHG | WEIGHT: 132.8 LBS | HEART RATE: 87 BPM | TEMPERATURE: 97.1 F | OXYGEN SATURATION: 100 % | BODY MASS INDEX: 20.84 KG/M2 | DIASTOLIC BLOOD PRESSURE: 86 MMHG

## 2023-12-18 DIAGNOSIS — R51.9 CHRONIC NONINTRACTABLE HEADACHE, UNSPECIFIED HEADACHE TYPE: Primary | ICD-10-CM

## 2023-12-18 DIAGNOSIS — R42 DIZZINESS: ICD-10-CM

## 2023-12-18 DIAGNOSIS — G89.29 CHRONIC NONINTRACTABLE HEADACHE, UNSPECIFIED HEADACHE TYPE: Primary | ICD-10-CM

## 2023-12-18 PROCEDURE — 99214 OFFICE O/P EST MOD 30 MIN: CPT | Performed by: INTERNAL MEDICINE

## 2023-12-18 NOTE — PROGRESS NOTES
Subjective   Skyler Huerta is a 53 y.o. male.     Chief Complaint   Patient presents with    Headache    Fatigue    Dizziness       Answers submitted by the patient for this visit:  Primary Reason for Visit (Submitted on 12/11/2023)  What is the primary reason for your visit?: Other  Other (Submitted on 12/11/2023)  Please describe your symptoms.: Headaches, itching, scratching all over body, spots on back. feeling weak,  fatigued/dizzy, light headed, shortness of breath.  Have you had these symptoms before?: Yes  How long have you been having these symptoms?: Greater than 2 weeks  Please list any medications you are currently taking for this condition.: ondansetron 4mg    History of Present Illness     Follow-up with persistent fevers patient has had negative workup and was seen by infectious disease.  He has had multiple hospital visits due to this but no cause could be found.  He was having some GI issues scheduled for CT of the abdomen and EGD.  Determined that if he has persistent problems especially with persistent headaches and may require neurology.    Patient has been having persistent headaches and now with itching and scratching all over the body feeling weak tired fatigued lightheaded and dizzy with some shortness of breath though his past CT of the chest has been negative.  Having persistent fatigue, headache and pressure behind eyes, neck pain, weak and dizziness still.  Fortunately, no fevers for over a month.  No vision changes or loss of vision.  No LOC.    The following portions of the patient's history were reviewed and updated as appropriate: allergies, current medications, past family history, past medical history, past social history, past surgical history and problem list.    Depression Screen:      5/15/2023     9:50 AM   PHQ-2/PHQ-9 Depression Screening   Little Interest or Pleasure in Doing Things 0-->not at all   Feeling Down, Depressed or Hopeless 0-->not at all   PHQ-9: Brief  Depression Severity Measure Score 0       Past Medical History:   Diagnosis Date    Acute frontal sinusitis     Colon polyp 2021    Removed    Diabetes mellitus 10/19/2023    Prediabetes.    HL (hearing loss) 1993    Left ear       Past Surgical History:   Procedure Laterality Date    COLONOSCOPY  2021    VASECTOMY  2005       Family History   Problem Relation Age of Onset    No Known Problems Mother     No Known Problems Father     Diabetes Maternal Grandfather         Diabetic    Heart disease Paternal Grandfather         Stents    Other Paternal Aunt         Sarcoidosis       Social History     Socioeconomic History    Marital status:    Tobacco Use    Smoking status: Never    Smokeless tobacco: Never   Vaping Use    Vaping Use: Never used   Substance and Sexual Activity    Alcohol use: Not Currently     Alcohol/week: 14.0 standard drinks of alcohol     Types: 12 Cans of beer, 2 Drinks containing 0.5 oz of alcohol per week    Drug use: Not Currently     Frequency: 2.0 times per week     Types: Marijuana    Sexual activity: Yes     Partners: Female     Birth control/protection: Vasectomy       Current Outpatient Medications   Medication Sig Dispense Refill    diclofenac (VOLTAREN) 75 MG EC tablet Take 1 tablet by mouth.      meloxicam (MOBIC) 15 MG tablet       NON FORMULARY Take 1,500 mcg by mouth 1 (One) Time Per Week. Vit D       No current facility-administered medications for this visit.       Review of Systems   Constitutional:  Positive for fatigue. Negative for activity change, appetite change, fever, unexpected weight gain and unexpected weight loss.   HENT:  Positive for sinus pressure. Negative for nosebleeds, rhinorrhea, trouble swallowing and voice change.    Eyes:  Negative for visual disturbance.   Respiratory:  Negative for cough, chest tightness, shortness of breath and wheezing.    Cardiovascular:  Negative for chest pain, palpitations and leg swelling.   Gastrointestinal:  Negative for  "abdominal pain, blood in stool, constipation, diarrhea, nausea, vomiting, GERD and indigestion.   Genitourinary:  Negative for dysuria, frequency and hematuria.   Musculoskeletal:  Positive for neck pain. Negative for arthralgias, back pain and myalgias.   Skin:  Negative for rash and wound.   Neurological:  Positive for dizziness and headache. Negative for tremors, weakness, light-headedness, numbness and memory problem.   Hematological:  Negative for adenopathy. Does not bruise/bleed easily.   Psychiatric/Behavioral:  Negative for sleep disturbance and depressed mood. The patient is not nervous/anxious.        Objective   /86 (BP Location: Right arm, Patient Position: Sitting, Cuff Size: Adult)   Pulse 87   Temp 97.1 °F (36.2 °C) (Temporal)   Ht 170.2 cm (67.01\")   Wt 60.2 kg (132 lb 12.8 oz)   SpO2 100%   BMI 20.79 kg/m²     Physical Exam  Vitals and nursing note reviewed.   Constitutional:       General: He is not in acute distress.     Appearance: He is well-developed. He is not diaphoretic.   HENT:      Head: Normocephalic and atraumatic.      Right Ear: External ear normal.      Left Ear: External ear normal.      Nose: Nose normal.   Eyes:      Conjunctiva/sclera: Conjunctivae normal.      Pupils: Pupils are equal, round, and reactive to light.   Neck:      Thyroid: No thyromegaly.      Trachea: No tracheal deviation.   Cardiovascular:      Rate and Rhythm: Normal rate and regular rhythm.      Heart sounds: Normal heart sounds. No murmur heard.     No friction rub. No gallop.   Pulmonary:      Effort: Pulmonary effort is normal. No respiratory distress.      Breath sounds: Normal breath sounds.   Abdominal:      General: Bowel sounds are normal.      Palpations: Abdomen is soft. There is no mass.      Tenderness: There is no abdominal tenderness. There is no guarding.   Musculoskeletal:         General: Normal range of motion.      Cervical back: Normal range of motion and neck supple. "   Lymphadenopathy:      Cervical: No cervical adenopathy.   Skin:     General: Skin is warm and dry.      Capillary Refill: Capillary refill takes less than 2 seconds.      Findings: No rash.   Neurological:      Mental Status: He is alert and oriented to person, place, and time.      Motor: No abnormal muscle tone.      Deep Tendon Reflexes: Reflexes normal.   Psychiatric:         Behavior: Behavior normal.         Thought Content: Thought content normal.         Judgment: Judgment normal.         Recent Results (from the past 2016 hour(s))   Comprehensive metabolic panel    Collection Time: 09/26/23  2:32 PM    Specimen: Blood   Result Value Ref Range    Glucose 92 70 - 99 mg/dL    BUN 8 6 - 24 mg/dL    Creatinine 0.99 0.76 - 1.27 mg/dL    EGFR Result 91 >59 mL/min/1.73    BUN/Creatinine Ratio 8 (L) 9 - 20    Sodium 140 134 - 144 mmol/L    Potassium 4.8 3.5 - 5.2 mmol/L    Chloride 99 96 - 106 mmol/L    Total CO2 23 20 - 29 mmol/L    Calcium 9.6 8.7 - 10.2 mg/dL    Total Protein 7.8 6.0 - 8.5 g/dL    Albumin 4.8 3.8 - 4.9 g/dL    Globulin 3.0 1.5 - 4.5 g/dL    A/G Ratio 1.6 1.2 - 2.2    Total Bilirubin 0.2 0.0 - 1.2 mg/dL    Alkaline Phosphatase 75 44 - 121 IU/L    AST (SGOT) 31 0 - 40 IU/L    ALT (SGPT) 54 (H) 0 - 44 IU/L   CBC w AUTO Differential    Collection Time: 09/26/23  2:32 PM    Specimen: Blood   Result Value Ref Range    WBC 4.9 3.4 - 10.8 x10E3/uL    RBC 5.44 4.14 - 5.80 x10E6/uL    Hemoglobin 14.2 13.0 - 17.7 g/dL    Hematocrit 44.6 37.5 - 51.0 %    MCV 82 79 - 97 fL    MCH 26.1 (L) 26.6 - 33.0 pg    MCHC 31.8 31.5 - 35.7 g/dL    RDW 14.7 11.6 - 15.4 %    Platelets 374 150 - 450 x10E3/uL    Neutrophil Rel % 56 Not Estab. %    Lymphocyte Rel % 28 Not Estab. %    Monocyte Rel % 12 Not Estab. %    Eosinophil Rel % 1 Not Estab. %    Basophil Rel % 1 Not Estab. %    Neutrophils Absolute 2.8 1.4 - 7.0 x10E3/uL    Lymphocytes Absolute 1.4 0.7 - 3.1 x10E3/uL    Monocytes Absolute 0.6 0.1 - 0.9 x10E3/uL     Eosinophils Absolute 0.1 0.0 - 0.4 x10E3/uL    Basophils Absolute 0.1 0.0 - 0.2 x10E3/uL    Immature Granulocyte Rel % 2 Not Estab. %    Immature Grans Absolute 0.1 0.0 - 0.1 x10E3/uL   Sedimentation Rate    Collection Time: 09/26/23  2:32 PM    Specimen: Blood   Result Value Ref Range    Sed Rate 20 0 - 30 mm/hr   C-reactive protein    Collection Time: 09/26/23  2:32 PM    Specimen: Blood   Result Value Ref Range    C-Reactive Protein 43 (H) 0 - 10 mg/L   Procalcitonin    Collection Time: 09/26/23  2:32 PM    Specimen: Blood   Result Value Ref Range    Procalcitonin 0.13 (H) 0.00 - 0.08 ng/mL   Hepatitis panel, acute    Collection Time: 09/26/23  2:32 PM    Specimen: Blood   Result Value Ref Range    Hep A IgM Negative Negative    Hepatitis B Surface Ag Negative Negative    Hep B Core IgM Negative Negative    Hepatitis C Ab Non Reactive Non Reactive   Interpretation:    Collection Time: 09/26/23  2:32 PM   Result Value Ref Range    Interpretation Comment    CBC & Differential    Collection Time: 10/04/23 12:46 PM    Specimen: Blood   Result Value Ref Range    WBC 4.4 3.4 - 10.8 x10E3/uL    RBC 5.48 4.14 - 5.80 x10E6/uL    Hemoglobin 14.3 13.0 - 17.7 g/dL    Hematocrit 44.1 37.5 - 51.0 %    MCV 81 79 - 97 fL    MCH 26.1 (L) 26.6 - 33.0 pg    MCHC 32.4 31.5 - 35.7 g/dL    RDW 14.8 11.6 - 15.4 %    Platelets 446 150 - 450 x10E3/uL    Neutrophil Rel % 64 Not Estab. %    Lymphocyte Rel % 27 Not Estab. %    Monocyte Rel % 6 Not Estab. %    Eosinophil Rel % 2 Not Estab. %    Basophil Rel % 1 Not Estab. %    Neutrophils Absolute 2.8 1.4 - 7.0 x10E3/uL    Lymphocytes Absolute 1.2 0.7 - 3.1 x10E3/uL    Monocytes Absolute 0.3 0.1 - 0.9 x10E3/uL    Eosinophils Absolute 0.1 0.0 - 0.4 x10E3/uL    Basophils Absolute 0.0 0.0 - 0.2 x10E3/uL    Immature Granulocyte Rel % 0 Not Estab. %    Immature Grans Absolute 0.0 0.0 - 0.1 x10E3/uL   Lyme Disease Total Antibody With Reflex to Immunoassay    Collection Time: 10/04/23 12:46 PM     Specimen: Blood   Result Value Ref Range    Lyme Total Antibody EIA Negative Negative   [place] (IgG / M)    Collection Time: 10/04/23 12:46 PM    Specimen: Blood   Result Value Ref Range    RMSF IgG Negative Negative    RMSF IgM 1.57 (H) 0.00 - 0.89 index   HIV-1 / O / 2 Ag / Antibody    Collection Time: 10/04/23 12:46 PM    Specimen: Blood   Result Value Ref Range    HIV Screen 4th Gen w/RFX (Reference) Non Reactive Non Reactive   RPR    Collection Time: 10/04/23 12:46 PM    Specimen: Blood   Result Value Ref Range    RPR Non Reactive Non Reactive   Chlamydia trachomatis, Neisseria gonorrhoeae, PCR - Urine, Urine, Clean Catch    Collection Time: 10/04/23  1:06 PM    Specimen: Urine, Clean Catch    UC   Result Value Ref Range    Chlamydia trachomatis, MARCELLA Negative Negative    Neisseria gonorrhoeae, MARCELLA Negative Negative   POC Urinalysis Dipstick, Automated    Collection Time: 10/16/23  3:51 PM    Specimen: Urine   Result Value Ref Range    Color Yellow Yellow, Straw, Dark Yellow, Nery    Clarity, UA Clear Clear    Specific Gravity  1.015 1.005 - 1.030    pH, Urine 6.0 5.0 - 8.0    Leukocytes Negative Negative    Nitrite, UA Negative Negative    Protein, POC Negative Negative mg/dL    Glucose, UA Negative Negative mg/dL    Ketones, UA Negative Negative    Urobilinogen, UA 0.2 E.U./dL Normal, 0.2 E.U./dL    Bilirubin Negative Negative    Blood, UA Negative Negative    Lot Number [id number]     Expiration Date 3,312,024    CBC & Differential    Collection Time: 10/16/23  4:05 PM    Specimen: Blood   Result Value Ref Range    WBC 6.7 3.4 - 10.8 x10E3/uL    RBC 5.19 4.14 - 5.80 x10E6/uL    Hemoglobin 13.6 13.0 - 17.7 g/dL    Hematocrit 42.3 37.5 - 51.0 %    MCV 82 79 - 97 fL    MCH 26.2 (L) 26.6 - 33.0 pg    MCHC 32.2 31.5 - 35.7 g/dL    RDW 14.4 11.6 - 15.4 %    Platelets 293 150 - 450 x10E3/uL    Neutrophil Rel % 73 Not Estab. %    Lymphocyte Rel % 19 Not Estab. %    Monocyte Rel % 6 Not Estab. %     Eosinophil Rel % 2 Not Estab. %    Basophil Rel % 0 Not Estab. %    Neutrophils Absolute 4.9 1.4 - 7.0 x10E3/uL    Lymphocytes Absolute 1.3 0.7 - 3.1 x10E3/uL    Monocytes Absolute 0.4 0.1 - 0.9 x10E3/uL    Eosinophils Absolute 0.1 0.0 - 0.4 x10E3/uL    Basophils Absolute 0.0 0.0 - 0.2 x10E3/uL    Immature Granulocyte Rel % 0 Not Estab. %    Immature Grans Absolute 0.0 0.0 - 0.1 x10E3/uL   Comprehensive Metabolic Panel    Collection Time: 10/16/23  4:05 PM    Specimen: Blood   Result Value Ref Range    Glucose 70 70 - 99 mg/dL    BUN 9 6 - 24 mg/dL    Creatinine 1.10 0.76 - 1.27 mg/dL    EGFR Result 80 >59 mL/min/1.73    BUN/Creatinine Ratio 8 (L) 9 - 20    Sodium 140 134 - 144 mmol/L    Potassium 4.3 3.5 - 5.2 mmol/L    Chloride 100 96 - 106 mmol/L    Total CO2 26 20 - 29 mmol/L    Calcium 10.0 8.7 - 10.2 mg/dL    Total Protein 7.6 6.0 - 8.5 g/dL    Albumin 5.0 (H) 3.8 - 4.9 g/dL    Globulin 2.6 1.5 - 4.5 g/dL    A/G Ratio 1.9 1.2 - 2.2    Total Bilirubin 0.4 0.0 - 1.2 mg/dL    Alkaline Phosphatase 77 44 - 121 IU/L    AST (SGOT) 29 0 - 40 IU/L    ALT (SGPT) 29 0 - 44 IU/L   Hemoglobin A1c    Collection Time: 10/16/23  4:05 PM    Specimen: Blood   Result Value Ref Range    Hemoglobin A1C 5.8 (H) 4.8 - 5.6 %   MUSA by IFA, Reflex 9-biomarkers profile    Collection Time: 10/16/23  4:05 PM    Specimen: Blood   Result Value Ref Range    MUSA Negative     Please note Comment    C-reactive Protein    Collection Time: 10/16/23  4:05 PM    Specimen: Blood   Result Value Ref Range    C-Reactive Protein 1 0 - 10 mg/L   Rheumatoid Factor    Collection Time: 10/16/23  4:05 PM    Specimen: Blood   Result Value Ref Range    RA Latex Turbid 10.6 <14.0 IU/mL   Sedimentation Rate    Collection Time: 10/16/23  4:05 PM    Specimen: Blood   Result Value Ref Range    Sed Rate 9 0 - 30 mm/hr   Uric Acid    Collection Time: 10/16/23  4:05 PM    Specimen: Blood   Result Value Ref Range    Uric Acid 4.9 3.8 - 8.4 mg/dL   TSH Rfx On Abnormal  To Free T4    Collection Time: 10/16/23  4:05 PM    Specimen: Blood   Result Value Ref Range    TSH 0.715 0.450 - 4.500 uIU/mL   Urine Culture - ,    Collection Time: 10/16/23  4:35 PM    UR   Result Value Ref Range    Urine Culture Final report     Result 1 Comment    POCT SARS-CoV-2 Antigen VANESSA + Flu    Collection Time: 10/24/23  3:45 PM    Specimen: Swab   Result Value Ref Range    SARS Antigen Not Detected Not Detected, Presumptive Negative    Influenza A Antigen VANESSA Not Detected Not Detected    Influenza B Antigen VANESSA Not Detected Not Detected    Internal Control Passed Passed    Lot Number [id number]     Expiration Date 10/10/2024    POCT rapid strep A    Collection Time: 10/24/23  3:46 PM    Specimen: Swab   Result Value Ref Range    Rapid Strep A Screen Negative Negative, VALID, INVALID, Not Performed    Internal Control Passed Passed    Lot Number [id number]     Expiration Date 03/30/2026      Assessment & Plan   Diagnoses and all orders for this visit:    1. Chronic nonintractable headache, unspecified headache type (Primary)  -     Ambulatory Referral to Neurology  -     MRI Brain Without Contrast    2. Dizziness  -     Ambulatory Referral to Neurology  -     MRI Brain Without Contrast    Patient with persistent issues headaches and dizziness.  Neck pain appears to be more muscular when his headache begins so therefore stress-induced.  At this time looking at his history reviewing all of his labs and imaging that he is already had we will do an MRI of the brain and refer the neurology unless otherwise found by his scan.  We discussed that at least he has not had any fevers for the last 3 weeks.           COVID-19 Precautions - Patient was compliant in wearing a mask. When I saw the patient, I used appropriate personal protective equipment (PPE) including mask and eye shield (standard procedure).  Additionally, I used gown and gloves if indicated.  Hand hygiene was completed before and after seeing the  patient.  Dictated utilizing Dragon Dictation

## 2023-12-22 ENCOUNTER — HOSPITAL ENCOUNTER (OUTPATIENT)
Dept: MRI IMAGING | Facility: HOSPITAL | Age: 53
Discharge: HOME | End: 2023-12-22
Admitting: INTERNAL MEDICINE
Payer: COMMERCIAL

## 2023-12-22 PROCEDURE — 70551 MRI BRAIN STEM W/O DYE: CPT

## 2024-02-26 ENCOUNTER — OFFICE VISIT (OUTPATIENT)
Dept: NEUROLOGY | Facility: CLINIC | Age: 54
End: 2024-02-26
Payer: COMMERCIAL

## 2024-02-26 VITALS
HEART RATE: 71 BPM | OXYGEN SATURATION: 97 % | HEIGHT: 67 IN | WEIGHT: 135 LBS | SYSTOLIC BLOOD PRESSURE: 120 MMHG | DIASTOLIC BLOOD PRESSURE: 78 MMHG | BODY MASS INDEX: 21.19 KG/M2

## 2024-02-26 DIAGNOSIS — G43.001 MIGRAINE WITHOUT AURA AND WITH STATUS MIGRAINOSUS, NOT INTRACTABLE: Primary | ICD-10-CM

## 2024-02-26 PROCEDURE — 99204 OFFICE O/P NEW MOD 45 MIN: CPT | Performed by: PSYCHIATRY & NEUROLOGY

## 2024-02-26 RX ORDER — SUMATRIPTAN 100 MG/1
100 TABLET, FILM COATED ORAL ONCE AS NEEDED
Qty: 9 TABLET | Refills: 3 | Status: SHIPPED | OUTPATIENT
Start: 2024-02-26 | End: 2024-03-27

## 2024-02-26 RX ORDER — OMEPRAZOLE 40 MG/1
40 CAPSULE, DELAYED RELEASE ORAL DAILY
COMMUNITY

## 2024-02-26 RX ORDER — AMITRIPTYLINE HYDROCHLORIDE 25 MG/1
25 TABLET, FILM COATED ORAL NIGHTLY
Qty: 30 TABLET | Refills: 3 | Status: SHIPPED | OUTPATIENT
Start: 2024-02-26

## 2024-02-26 NOTE — PROGRESS NOTES
Notes by MA:  Pt is here today as a referral from Dr Church. He has been experiencing a migraine daily since September.      Subjective:     Dear Dr. Church, thank you for referring Mr. Huerta for consultation.  As you know, he is a 53-year-old right-handed gentleman sent for evaluation and management of headaches.  He has had a long history of headaches, typically holocephalic and associated with nausea, occasional vomiting, dizziness, light sensitivity, sound sensitivity and movement sensitivity.  These can last for hours at a time.  He was using Tylenol fairly regularly for these.  He cannot take NSAIDs and therefore has not been able to use anything for some time.  He simply lays down and waits it out.  This often interferes with work.  The headache frequency worsened after a bout of viral infection last fall.  He is now having daily headaches.  Patient ID: Skyler Huerta is a 53 y.o. male.    Headache    The following portions of the patient's history were reviewed and updated as appropriate: allergies, current medications, past family history, past medical history, past social history, past surgical history, and problem list.    Review of Systems   Constitutional:  Positive for activity change and fatigue. Negative for appetite change.   HENT:  Positive for trouble swallowing (feels like it isn't going down.). Negative for facial swelling and voice change.    Eyes:  Positive for photophobia and pain (pressuer behind eyes). Negative for visual disturbance.   Respiratory:  Negative for chest tightness, shortness of breath and wheezing.    Cardiovascular:  Negative for chest pain, palpitations and leg swelling.   Gastrointestinal:  Positive for abdominal pain and nausea. Negative for vomiting.   Endocrine: Negative for cold intolerance and heat intolerance.   Musculoskeletal:  Positive for arthralgias, back pain, myalgias, neck pain and neck stiffness. Negative for gait problem and joint swelling.    Neurological:  Positive for dizziness (with headache), speech difficulty (getting words out), weakness, numbness (fingers and feet) and headaches. Negative for tremors, seizures, syncope, facial asymmetry and light-headedness.   Hematological:  Does not bruise/bleed easily.   Psychiatric/Behavioral:  Positive for confusion (with HA), decreased concentration (with HA) and sleep disturbance. Negative for agitation, behavioral problems, dysphoric mood, hallucinations, self-injury and suicidal ideas. The patient is not nervous/anxious and is not hyperactive.         Objective:    Neurologic Exam  Awake alert pleasant cooperative oriented in all spheres with fluent speech and normal speech comprehension.    Cranial nerves II through XII normal and symmetric.    Motor exam reveals normal symmetric tone bulk and power throughout without drift or spasticity.  No adventitious movements.    The sensory exam reveals normal and symmetric sensation to light touch, temperature, vibration throughout.    Coordination testing reveals smooth and accurate finger-nose-finger bilaterally, normal heel-to-shin bilaterally and normal rhythmic rapid alternating movements.  Romberg testing is negative.    Tendon reflexes are 2+ and symmetric throughout including preserved ankle jerks bilaterally.  No ankle clonus.    Physical Exam    Assessment/Plan:     Diagnoses and all orders for this visit:    1. Migraine without aura and with status migrainosus, not intractable (Primary)    Other orders  -     amitriptyline (ELAVIL) 25 MG tablet; Take 1 tablet by mouth Every Night.  Dispense: 30 tablet; Refill: 3  -     SUMAtriptan (IMITREX) 100 MG tablet; Take 1 tablet by mouth 1 (One) Time As Needed for Migraine for up to 30 days. Take one tablet at onset of headache. May repeat dose one time in 2 hours if headache not relieved.  Dispense: 9 tablet; Refill: 3     53-year-old gentleman with a long history of headaches satisfying diagnostic criteria  for migraine.  Their frequency and duration is approaching chronic migraine diagnostic criteria.  This seems to have been flared up by a viral illness that he experienced last fall.  We talked about lifestyle factors that can affect headache frequency and severity.  Given the frequency of headaches, prophylaxis is certainly warranted.  We are starting him on amitriptyline 25 mg nightly to help with headache frequency and sleep.  We discussed possible side effects.  From an abortive standpoint, starting him on sumatriptan 100 mg as needed.  He was given specific instructions on its appropriate use as well as potential side effects.  Will see him back in about 3 months time to check on his progress.

## 2024-03-04 ENCOUNTER — PATIENT ROUNDING (BHMG ONLY) (OUTPATIENT)
Dept: NEUROLOGY | Facility: CLINIC | Age: 54
End: 2024-03-04
Payer: COMMERCIAL

## 2024-03-18 ENCOUNTER — OFFICE VISIT (OUTPATIENT)
Dept: FAMILY MEDICINE CLINIC | Facility: CLINIC | Age: 54
End: 2024-03-18
Payer: COMMERCIAL

## 2024-03-18 VITALS
TEMPERATURE: 97.7 F | OXYGEN SATURATION: 100 % | DIASTOLIC BLOOD PRESSURE: 96 MMHG | BODY MASS INDEX: 21.6 KG/M2 | SYSTOLIC BLOOD PRESSURE: 158 MMHG | HEIGHT: 67 IN | WEIGHT: 137.6 LBS | HEART RATE: 53 BPM

## 2024-03-18 DIAGNOSIS — M54.50 ACUTE LEFT-SIDED LOW BACK PAIN WITHOUT SCIATICA: ICD-10-CM

## 2024-03-18 DIAGNOSIS — G89.29 CHRONIC INTRACTABLE HEADACHE, UNSPECIFIED HEADACHE TYPE: ICD-10-CM

## 2024-03-18 DIAGNOSIS — R42 DIZZINESS: Primary | ICD-10-CM

## 2024-03-18 DIAGNOSIS — R51.9 CHRONIC INTRACTABLE HEADACHE, UNSPECIFIED HEADACHE TYPE: ICD-10-CM

## 2024-03-18 PROCEDURE — 99214 OFFICE O/P EST MOD 30 MIN: CPT | Performed by: INTERNAL MEDICINE

## 2024-03-18 RX ORDER — BACLOFEN 10 MG/1
10 TABLET ORAL 3 TIMES DAILY
Qty: 30 TABLET | Refills: 0 | Status: SHIPPED | OUTPATIENT
Start: 2024-03-18

## 2024-03-18 NOTE — PROGRESS NOTES
Subjective   Skyler Huerta is a 54 y.o. male.     Chief Complaint   Patient presents with    Headache     Follow up    Dizziness     Follow up       History of Present Illness   Answers submitted by the patient for this visit:  Primary Reason for Visit (Submitted on 3/15/2024)  What is the primary reason for your visit?: Other  Other (Submitted on 3/15/2024)  Please describe your symptoms.: Fatigue, dizziness, migraine, weakness, stiff neck, stomach pain especially after eating, joint pain, pressure behind eyes, diarrhea and constipated, bloated, nausea, insomnia,  Have you had these symptoms before?: Yes  How long have you been having these symptoms?: Greater than 2 weeks  Please list any medications you are currently taking for this condition.: Ergocalif, Omeprazole, Amitriptyline, Sumatripan, Meloxicam.  Please describe any probable cause for these symptoms. : Chronic fatigue, Migraine, Gastritis, Tinnitus    Has been having 3 weeks of left low back pain.  No new activities.  Using meloxicam when stomach issues arise, otherwise the diclofenac PRN.  Began after started new job at work.  Has chronic fatigue issues that is worse with headaches and dizziness.    Persistent headaches and dizziness.  Did have itching, short of breath, fatigue and pressure behind eyes.   CT chest negative and MRI head negative. Has been seen by neurology and diagnosed with migraines and being treated.  Started amitritylline 25 mg nightly and sumatriptan 100 mg PRN.  Has follow up 5/24/24.  So far no change with persistent fatigue, headache, dizziness and aching.  The itching and rashes are resolved.    Patient is also seeing [facility] and diagnosed with tendonitis, gastritis with stomach nodule and planning further tests.    The following portions of the patient's history were reviewed and updated as appropriate: allergies, current medications, past family history, past medical history, past social history, past surgical history and  problem list.    Depression Screen:      5/15/2023     9:50 AM   PHQ-2/PHQ-9 Depression Screening   Little Interest or Pleasure in Doing Things 0-->not at all   Feeling Down, Depressed or Hopeless 0-->not at all   PHQ-9: Brief Depression Severity Measure Score 0       Past Medical History:   Diagnosis Date    Acute frontal sinusitis     Colon polyp 2021    Removed    Diabetes mellitus 10/19/2023    Prediabetes.    Gastritis 2024    HL (hearing loss) 1993    Left ear    Tendonitis 2024       Past Surgical History:   Procedure Laterality Date    COLONOSCOPY  2021    VASECTOMY  2005       Family History   Problem Relation Age of Onset    No Known Problems Mother     No Known Problems Father     Diabetes Maternal Grandfather         Diabetic    Heart disease Paternal Grandfather         Stents    Other Paternal Aunt         Sarcoidosis       Social History     Socioeconomic History    Marital status:    Tobacco Use    Smoking status: Never     Passive exposure: Past    Smokeless tobacco: Never   Vaping Use    Vaping status: Never Used   Substance and Sexual Activity    Alcohol use: Not Currently     Alcohol/week: 14.0 standard drinks of alcohol     Types: 12 Cans of beer, 2 Drinks containing 0.5 oz of alcohol per week    Drug use: Not Currently     Frequency: 2.0 times per week     Types: Marijuana    Sexual activity: Yes     Partners: Female     Birth control/protection: Vasectomy       Current Outpatient Medications   Medication Sig Dispense Refill    amitriptyline (ELAVIL) 25 MG tablet Take 1 tablet by mouth Every Night. 30 tablet 3    diclofenac (VOLTAREN) 75 MG EC tablet Take 1 tablet by mouth.      meloxicam (MOBIC) 15 MG tablet       NON FORMULARY Take 1,500 mcg by mouth 1 (One) Time Per Week. Vit D      omeprazole (priLOSEC) 40 MG capsule Take 1 capsule by mouth Daily.      SUMAtriptan (IMITREX) 100 MG tablet Take 1 tablet by mouth 1 (One) Time As Needed for Migraine for up to 30 days. Take one tablet at  "onset of headache. May repeat dose one time in 2 hours if headache not relieved. 9 tablet 3    baclofen (LIORESAL) 10 MG tablet Take 1 tablet by mouth 3 (Three) Times a Day. 30 tablet 0     No current facility-administered medications for this visit.       Review of Systems   Constitutional:  Positive for fatigue. Negative for activity change, appetite change, fever, unexpected weight gain and unexpected weight loss.   HENT:  Negative for nosebleeds, rhinorrhea, trouble swallowing and voice change.    Eyes:  Negative for visual disturbance.   Respiratory:  Negative for cough, chest tightness, shortness of breath and wheezing.    Cardiovascular:  Negative for chest pain, palpitations and leg swelling.   Gastrointestinal:  Positive for nausea. Negative for abdominal pain, blood in stool, constipation, diarrhea, vomiting, GERD and indigestion.   Genitourinary:  Negative for dysuria, frequency and hematuria.   Musculoskeletal:  Positive for arthralgias. Negative for back pain and myalgias.   Skin:  Negative for rash and wound.   Neurological:  Positive for dizziness and headache. Negative for tremors, weakness, light-headedness, numbness and memory problem.   Hematological:  Negative for adenopathy. Does not bruise/bleed easily.   Psychiatric/Behavioral:  Negative for sleep disturbance and depressed mood. The patient is not nervous/anxious.        Objective   /96 (BP Location: Left arm, Patient Position: Sitting, Cuff Size: Adult)   Pulse 53   Temp 97.7 °F (36.5 °C) (Temporal)   Ht 170.2 cm (67.01\")   Wt 62.4 kg (137 lb 9.6 oz)   SpO2 100%   BMI 21.55 kg/m²     Physical Exam  Vitals and nursing note reviewed.   Constitutional:       General: He is not in acute distress.     Appearance: He is well-developed. He is not diaphoretic.   HENT:      Head: Normocephalic and atraumatic.      Right Ear: External ear normal.      Left Ear: External ear normal.      Nose: Nose normal.   Eyes:      Conjunctiva/sclera: " Conjunctivae normal.      Pupils: Pupils are equal, round, and reactive to light.   Neck:      Thyroid: No thyromegaly.      Trachea: No tracheal deviation.   Cardiovascular:      Rate and Rhythm: Normal rate and regular rhythm.      Heart sounds: Normal heart sounds. No murmur heard.     No friction rub. No gallop.   Pulmonary:      Effort: Pulmonary effort is normal. No respiratory distress.      Breath sounds: Normal breath sounds.   Abdominal:      General: Bowel sounds are normal.      Palpations: Abdomen is soft. There is no mass.      Tenderness: There is no abdominal tenderness. There is no guarding.   Musculoskeletal:         General: Normal range of motion.      Cervical back: Normal range of motion and neck supple.   Lymphadenopathy:      Cervical: No cervical adenopathy.   Skin:     General: Skin is warm and dry.      Capillary Refill: Capillary refill takes less than 2 seconds.      Findings: No rash.   Neurological:      Mental Status: He is alert and oriented to person, place, and time.      Motor: No abnormal muscle tone.      Deep Tendon Reflexes: Reflexes normal.   Psychiatric:         Behavior: Behavior normal.         Thought Content: Thought content normal.         Judgment: Judgment normal.         No results found for this or any previous visit (from the past 2016 hour(s)).  Assessment & Plan   Diagnoses and all orders for this visit:    1. Dizziness (Primary)    2. Chronic intractable headache, unspecified headache type    3. Acute left-sided low back pain without sciatica  -     baclofen (LIORESAL) 10 MG tablet; Take 1 tablet by mouth 3 (Three) Times a Day.  Dispense: 30 tablet; Refill: 0    Discussed and reviewed his specialist visits and current conditions.  Will treat with baclofen for his back pain as needed only.  Did discuss the risk of the medication associated with his amitriptyline and possibly increasing fatigue and somnolence.  Patient is understanding.  Follow-up with neurology  as scheduled.  Discussed his blood pressure being elevated today which may be secondary to his headaches.  Will follow closely on his next follow-up.           COVID-19 Precautions - Patient was compliant in wearing a mask. When I saw the patient, I used appropriate personal protective equipment (PPE) including mask and eye shield (standard procedure).  Additionally, I used gown and gloves if indicated.  Hand hygiene was completed before and after seeing the patient.  Dictated utilizing Dragon Dictation

## 2024-05-10 ENCOUNTER — SPECIALTY PHARMACY (OUTPATIENT)
Dept: INFUSION THERAPY | Facility: HOSPITAL | Age: 54
End: 2024-05-10
Payer: COMMERCIAL

## 2024-05-10 RX ORDER — SUMATRIPTAN 100 MG/1
100 TABLET, FILM COATED ORAL ONCE AS NEEDED
Qty: 9 TABLET | Refills: 3 | Status: SHIPPED | OUTPATIENT
Start: 2024-05-10 | End: 2024-06-09

## 2024-05-24 ENCOUNTER — OFFICE VISIT (OUTPATIENT)
Dept: NEUROLOGY | Facility: CLINIC | Age: 54
End: 2024-05-24
Payer: COMMERCIAL

## 2024-05-24 VITALS
BODY MASS INDEX: 21.2 KG/M2 | HEART RATE: 83 BPM | OXYGEN SATURATION: 97 % | WEIGHT: 135.4 LBS | SYSTOLIC BLOOD PRESSURE: 140 MMHG | DIASTOLIC BLOOD PRESSURE: 90 MMHG

## 2024-05-24 DIAGNOSIS — G43.001 MIGRAINE WITHOUT AURA AND WITH STATUS MIGRAINOSUS, NOT INTRACTABLE: Primary | ICD-10-CM

## 2024-05-24 DIAGNOSIS — R11.2 NAUSEA VOMITING AND DIARRHEA: ICD-10-CM

## 2024-05-24 DIAGNOSIS — R19.7 NAUSEA VOMITING AND DIARRHEA: ICD-10-CM

## 2024-05-24 PROCEDURE — 99214 OFFICE O/P EST MOD 30 MIN: CPT | Performed by: PSYCHIATRY & NEUROLOGY

## 2024-05-24 RX ORDER — ONDANSETRON 4 MG/1
TABLET, ORALLY DISINTEGRATING ORAL
Qty: 20 TABLET | Refills: 0 | OUTPATIENT
Start: 2024-05-24

## 2024-05-24 RX ORDER — POLYETHYLENE GLYCOL 1000
POWDER (GRAM) MISCELLANEOUS
COMMUNITY

## 2024-05-24 RX ORDER — DICYCLOMINE HYDROCHLORIDE 10 MG/1
10 CAPSULE ORAL
COMMUNITY

## 2024-05-24 RX ORDER — AMITRIPTYLINE HYDROCHLORIDE 10 MG/1
10 TABLET, FILM COATED ORAL NIGHTLY
Qty: 30 TABLET | Refills: 3 | Status: SHIPPED | OUTPATIENT
Start: 2024-05-24

## 2024-05-24 RX ORDER — RIZATRIPTAN BENZOATE 10 MG/1
10 TABLET ORAL ONCE AS NEEDED
Qty: 9 TABLET | Refills: 3 | Status: SHIPPED | OUTPATIENT
Start: 2024-05-24 | End: 2024-06-23

## 2024-05-24 RX ORDER — CALCIUM POLYCARBOPHIL 625 MG
TABLET ORAL DAILY
COMMUNITY

## 2024-05-24 RX ORDER — LOPERAMIDE HYDROCHLORIDE 2 MG/1
2 CAPSULE ORAL 4 TIMES DAILY PRN
COMMUNITY

## 2024-05-24 NOTE — PROGRESS NOTES
Notes by MA:  Pt is here today for a follow up appointment. He feels that his migraines are worsening.      Subjective:     Patient ID: Skyler Huerta is a 54 y.o. male.    History of Present Illness  The following portions of the patient's history were reviewed and updated as appropriate: allergies, current medications, past family history, past medical history, past social history, past surgical history, and problem list.    Review of Systems   Musculoskeletal:  Negative for gait problem.   Neurological:  Positive for headaches. Negative for dizziness, tremors, seizures, syncope, facial asymmetry, speech difficulty, weakness, light-headedness and numbness.   Psychiatric/Behavioral:  Negative for agitation, behavioral problems, confusion, decreased concentration, dysphoric mood, hallucinations, self-injury, sleep disturbance and suicidal ideas. The patient is not nervous/anxious and is not hyperactive.         Objective:    Neurologic Exam  Awake alert pleasant cooperative oriented in all spheres with fluent speech and normal speech comprehension.     Cranial nerves II through XII normal and symmetric.     Motor exam reveals normal symmetric tone bulk and power throughout without drift or spasticity.  No adventitious movements.     The sensory exam reveals normal and symmetric sensation to light touch, temperature, vibration throughout.     Coordination testing reveals smooth and accurate finger-nose-finger bilaterally, normal heel-to-shin bilaterally and normal rhythmic rapid alternating movements.  Romberg testing is negative.     Tendon reflexes are 2+ and symmetric throughout including preserved ankle jerks bilaterally.  No ankle clonus.  Physical Exam    Assessment/Plan:     Diagnoses and all orders for this visit:    1. Migraine without aura and with status migrainosus, not intractable (Primary)    Other orders  -     rizatriptan (Maxalt) 10 MG tablet; Take 1 tablet by mouth 1 (One) Time As Needed for  Migraine for up to 30 days. May repeat in 2 hours if needed  Dispense: 9 tablet; Refill: 3  -     amitriptyline (ELAVIL) 10 MG tablet; Take 1 tablet by mouth Every Night.  Dispense: 30 tablet; Refill: 3     Migraines have not really changed in frequency or severity.  His acute therapy with Imitrex is only providing partial relief.  Even with 2 doses it is not completely eliminating his migraines.  Moving his acute therapy to Maxalt 10 mg as needed.  I gave him specific instructions on its appropriate use and potential side effects.  From a prophylactic standpoint, he cannot take amitriptyline every night because of daytime drowsiness.  I am moving his dose down to 10 mg nightly and we will see how he does with this over the coming 3 months.

## 2024-05-24 NOTE — TELEPHONE ENCOUNTER
LOV             3/18/24  NOV             (around 9/18/2024) for Next scheduled follow up.    Last RF         ondansetron ODT (ZOFRAN-ODT) 4 MG disintegrating tablet     The original prescription was discontinued on 12/18/2023 by   Palmer Church,                Deborah Butler, ISAIASA/LMR

## 2024-05-29 NOTE — TELEPHONE ENCOUNTER
Wants refill on Odansetron ODT 4 mg  Last filled 9/26/23    LOV             3/18/2024   NOV             (around 9/18/2024) for Next scheduled follow up.   Last RF        9/26/23  Protocol       Not met    NINO Mcknight/LMR

## 2024-05-30 RX ORDER — ONDANSETRON 4 MG/1
4 TABLET, ORALLY DISINTEGRATING ORAL EVERY 8 HOURS PRN
Qty: 20 TABLET | Refills: 0 | Status: SHIPPED | OUTPATIENT
Start: 2024-05-30

## 2024-06-07 RX ORDER — RIZATRIPTAN BENZOATE 10 MG/1
10 TABLET ORAL ONCE AS NEEDED
Qty: 9 TABLET | Refills: 3 | Status: SHIPPED | OUTPATIENT
Start: 2024-06-07

## 2024-07-17 RX ORDER — AMITRIPTYLINE HYDROCHLORIDE 10 MG/1
10 TABLET, FILM COATED ORAL NIGHTLY
Qty: 90 TABLET | Refills: 1 | Status: SHIPPED | OUTPATIENT
Start: 2024-07-17

## 2024-07-29 ENCOUNTER — OFFICE VISIT (OUTPATIENT)
Dept: FAMILY MEDICINE CLINIC | Facility: CLINIC | Age: 54
End: 2024-07-29
Payer: COMMERCIAL

## 2024-07-29 VITALS
SYSTOLIC BLOOD PRESSURE: 136 MMHG | OXYGEN SATURATION: 98 % | HEART RATE: 67 BPM | DIASTOLIC BLOOD PRESSURE: 84 MMHG | BODY MASS INDEX: 20.18 KG/M2 | TEMPERATURE: 98.9 F | WEIGHT: 128.6 LBS | HEIGHT: 67 IN

## 2024-07-29 DIAGNOSIS — G93.32 CFS (CHRONIC FATIGUE SYNDROME): ICD-10-CM

## 2024-07-29 DIAGNOSIS — I10 PRIMARY HYPERTENSION: Primary | ICD-10-CM

## 2024-07-29 PROBLEM — G43.009 MIGRAINE WITHOUT AURA AND WITHOUT STATUS MIGRAINOSUS, NOT INTRACTABLE: Status: ACTIVE | Noted: 2024-07-29

## 2024-07-29 PROCEDURE — 99213 OFFICE O/P EST LOW 20 MIN: CPT | Performed by: INTERNAL MEDICINE

## 2024-07-29 RX ORDER — HYDROCHLOROTHIAZIDE 12.5 MG/1
12.5 TABLET ORAL DAILY
COMMUNITY

## 2024-07-29 NOTE — PROGRESS NOTES
Subjective   Skyler Huerta is a 54 y.o. male.     Chief Complaint   Patient presents with    Hypertension     At home has noticed his systolic has been up into the 140's and 150's. The diastolic has been in the 90's    Fatigue   Answers submitted by the patient for this visit:  Primary Reason for Visit (Submitted on 7/24/2024)  What is the primary reason for your visit?: Other  Other (Submitted on 7/24/2024)  Please describe your symptoms.: FATIGUE, WEAKNESS, DIZZINESS, STIFF NECK, ACHES AND PAINS, LOW ENERGY,  FORGETFULNESS., ELEVATED BLOOD PRESSURE.  Have you had these symptoms before?: Yes  How long have you been having these symptoms?: Greater than 2 weeks  Please list any medications you are currently taking for this condition.: N/A  Please describe any probable cause for these symptoms. : GULF WAR EXPOSURE    History of Present Illness   Patient states is having multiple issues with fatigue, weakness, dizziness episodes, stiffness in the neck with full body aches and pains, forgetting easily and the blood pressure has been elevated.  Believes that some of this is actually due to his past  service.  Patient with symptoms of CFS.  Since first visit on 9/23/24 has been having intermittent fever, body aches , fatigue, headaches, difficulty concentrating, sleep disturbance and joint pains throughout.    The following portions of the patient's history were reviewed and updated as appropriate: allergies, current medications, past family history, past medical history, past social history, past surgical history and problem list.    Depression Screen:      5/15/2023     9:50 AM   PHQ-2/PHQ-9 Depression Screening   Little Interest or Pleasure in Doing Things 0-->not at all   Feeling Down, Depressed or Hopeless 0-->not at all   PHQ-9: Brief Depression Severity Measure Score 0       Past Medical History:   Diagnosis Date    Acute frontal sinusitis     Colon polyp 2021    Removed    Diabetes mellitus 10/19/2023     Prediabetes.    Gastritis 2024    GERD (gastroesophageal reflux disease) 04/2024    [facility]    Headache 4/01/1990    HL (hearing loss) 1993    Left ear    Hypertension 05/2024    Irritable bowel syndrome 05/2024    diagnosis [facility]    Tinnitus        Past Surgical History:   Procedure Laterality Date    COLONOSCOPY  2021    VASECTOMY  2005       Family History   Problem Relation Age of Onset    No Known Problems Mother     No Known Problems Father     Diabetes Maternal Grandfather         Diabetic    Heart disease Paternal Grandfather         Stents    Other Paternal Aunt         Sarcoidosis       Social History     Socioeconomic History    Marital status:    Tobacco Use    Smoking status: Never     Passive exposure: Past    Smokeless tobacco: Never   Vaping Use    Vaping status: Never Used   Substance and Sexual Activity    Alcohol use: Not Currently     Alcohol/week: 14.0 standard drinks of alcohol     Types: 12 Cans of beer, 2 Drinks containing 0.5 oz of alcohol per week    Drug use: Not Currently     Frequency: 2.0 times per week     Types: Marijuana    Sexual activity: Yes     Partners: Female     Birth control/protection: Vasectomy       Current Outpatient Medications   Medication Sig Dispense Refill    amitriptyline (ELAVIL) 10 MG tablet Take 1 tablet by mouth Every Night. 90 tablet 1    baclofen (LIORESAL) 10 MG tablet Take 1 tablet by mouth 3 (Three) Times a Day. 30 tablet 0    diclofenac (VOLTAREN) 75 MG EC tablet Take 1 tablet by mouth.      dicyclomine (BENTYL) 10 MG capsule Take 1 capsule by mouth 4 (Four) Times a Day Before Meals & at Bedtime.      hydroCHLOROthiazide 12.5 MG tablet Take 1 tablet by mouth Daily.      loperamide (IMODIUM) 2 MG capsule Take 1 capsule by mouth 4 (Four) Times a Day As Needed for Diarrhea.      meloxicam (MOBIC) 15 MG tablet       NON FORMULARY Take 1,500 mcg by mouth 1 (One) Time Per Week. Vit D      omeprazole (priLOSEC) 40 MG capsule Take 1 capsule  "by mouth Daily.      ondansetron ODT (ZOFRAN-ODT) 4 MG disintegrating tablet Place 1 tablet on the tongue Every 8 (Eight) Hours As Needed for Nausea or Vomiting. 20 tablet 0    polycarbophil (calcium polycarbophil) 625 MG tablet tablet Take  by mouth Daily.      Polyethylene Glycol 1000 powder Use.      rizatriptan (MAXALT) 10 MG tablet Take 1 tablet by mouth 1 (One) Time As Needed for Migraine. May repeat in 2 hours if needed 9 tablet 3     No current facility-administered medications for this visit.       Review of Systems   Constitutional:  Negative for activity change, appetite change, fatigue, fever, unexpected weight gain and unexpected weight loss.   HENT:  Negative for nosebleeds, rhinorrhea, trouble swallowing and voice change.    Eyes:  Negative for visual disturbance.   Respiratory:  Negative for cough, chest tightness, shortness of breath and wheezing.    Cardiovascular:  Negative for chest pain, palpitations and leg swelling.   Gastrointestinal:  Negative for abdominal pain, blood in stool, constipation, diarrhea, nausea, vomiting, GERD and indigestion.   Genitourinary:  Negative for dysuria, frequency and hematuria.   Musculoskeletal:  Negative for arthralgias, back pain and myalgias.   Skin:  Negative for rash and wound.   Neurological:  Negative for dizziness, tremors, weakness, light-headedness, numbness, headache and memory problem.   Hematological:  Negative for adenopathy. Does not bruise/bleed easily.   Psychiatric/Behavioral:  Negative for sleep disturbance and depressed mood. The patient is not nervous/anxious.        Objective   /84 (BP Location: Left arm, Patient Position: Sitting, Cuff Size: Adult)   Pulse 67   Temp 98.9 °F (37.2 °C) (Temporal)   Ht 170.2 cm (67.01\")   Wt 58.3 kg (128 lb 9.6 oz)   SpO2 98%   BMI 20.14 kg/m²     Physical Exam  Constitutional:       General: He is not in acute distress.     Appearance: He is not ill-appearing or diaphoretic.      Comments: Thin " male body habitus with no acute symptoms.  Chronic fatigue.   HENT:      Head: Normocephalic and atraumatic.      Right Ear: External ear normal.      Left Ear: External ear normal.      Nose: Nose normal.      Mouth/Throat:      Mouth: Mucous membranes are moist.   Eyes:      Extraocular Movements: Extraocular movements intact.      Conjunctiva/sclera: Conjunctivae normal.      Pupils: Pupils are equal, round, and reactive to light.   Cardiovascular:      Rate and Rhythm: Bradycardia present.   Pulmonary:      Effort: Pulmonary effort is normal. No respiratory distress.   Skin:     Coloration: Skin is not jaundiced.   Neurological:      General: No focal deficit present.      Mental Status: He is oriented to person, place, and time.         No results found for this or any previous visit (from the past 2016 hour(s)).  Assessment & Plan   Diagnoses and all orders for this visit:    1. Primary hypertension (Primary)    2. CFS (chronic fatigue syndrome)    Patient with a fatigue has been going on for almost a year now.  Started with fevers myalgia headaches weakness difficulty concentrating.  Have ruled out thyroid with labs and ruled out other causes.  At this time appears to be chronic fatigue treat as above.  Also noted to have hypertension but is now controlled on the hydrochlorothiazide and will observe.           Dictated utilizing Dragon Dictation

## 2024-08-21 DIAGNOSIS — M54.50 ACUTE LEFT-SIDED LOW BACK PAIN WITHOUT SCIATICA: ICD-10-CM

## 2024-08-21 RX ORDER — BACLOFEN 10 MG/1
10 TABLET ORAL 3 TIMES DAILY
Qty: 30 TABLET | Refills: 0 | Status: SHIPPED | OUTPATIENT
Start: 2024-08-21

## 2024-08-23 ENCOUNTER — OFFICE VISIT (OUTPATIENT)
Dept: NEUROLOGY | Facility: CLINIC | Age: 54
End: 2024-08-23
Payer: COMMERCIAL

## 2024-08-23 VITALS — SYSTOLIC BLOOD PRESSURE: 138 MMHG | DIASTOLIC BLOOD PRESSURE: 80 MMHG | HEART RATE: 73 BPM | OXYGEN SATURATION: 100 %

## 2024-08-23 DIAGNOSIS — G43.001 MIGRAINE WITHOUT AURA AND WITH STATUS MIGRAINOSUS, NOT INTRACTABLE: Primary | ICD-10-CM

## 2024-08-23 PROCEDURE — 99214 OFFICE O/P EST MOD 30 MIN: CPT | Performed by: PSYCHIATRY & NEUROLOGY

## 2024-08-23 RX ORDER — KETOROLAC TROMETHAMINE 30 MG/ML
30 INJECTION, SOLUTION INTRAMUSCULAR; INTRAVENOUS ONCE
Status: COMPLETED | OUTPATIENT
Start: 2024-08-23 | End: 2024-08-23

## 2024-08-23 RX ORDER — TOPIRAMATE 25 MG/1
25 TABLET ORAL NIGHTLY
Qty: 30 TABLET | Refills: 3 | Status: SHIPPED | OUTPATIENT
Start: 2024-08-23 | End: 2025-08-23

## 2024-08-23 RX ADMIN — KETOROLAC TROMETHAMINE 30 MG: 30 INJECTION, SOLUTION INTRAMUSCULAR; INTRAVENOUS at 08:17

## 2024-08-23 NOTE — PROGRESS NOTES
Notes by MA:  Pt is here today for a follow up on migraine headaches.      Subjective:     Patient ID: Skyler Huerta is a 54 y.o. male.    History of Present Illness  The following portions of the patient's history were reviewed and updated as appropriate: allergies, current medications, past family history, past medical history, past social history, past surgical history, and problem list.    Review of Systems   Neurological:  Positive for headaches.        Objective:    Neurologic Exam  Awake alert pleasant cooperative oriented in all spheres with fluent speech and normal speech comprehension.  Appears quite uncomfortable and light sensitive today.    Cranial nerves II through XII normal and symmetric.    Motor exam reveals normal symmetric tone bulk and power throughout without drift or spasticity.  No adventitious movements.    The sensory exam reveals normal and symmetric sensation to light touch, temperature, vibration throughout.    Coordination testing reveals smooth and accurate finger-nose-finger bilaterally, normal heel-to-shin bilaterally and normal rhythmic rapid alternating movements.  Romberg testing is negative.    Tendon reflexes are 2+ and symmetric throughout including preserved ankle jerks bilaterally.  No ankle clonus.    Physical Exam    Assessment/Plan:     Diagnoses and all orders for this visit:    1. Migraine without aura and with status migrainosus, not intractable (Primary)    Other orders  -     topiramate (Topamax) 25 MG tablet; Take 1 tablet by mouth Every Night.  Dispense: 30 tablet; Refill: 3     Struggling with a migraine today.  He had to stop the amitriptyline because even 10 mg nightly caused too much sedation the following day.  I am changing him over to topiramate 25 mg nightly and we had a long talk about possible side effects and the importance of good hydration.  His rizatriptan works but very often he has a returning headache in 2 to 3 hours and have encouraged him to  use that second dose if he needs it.  He has not been doing this.  If this does not provide adequate relief we can move onto CGRP products.  See him back in 3 months to see how he is doing.

## 2024-09-02 DIAGNOSIS — M54.50 ACUTE LEFT-SIDED LOW BACK PAIN WITHOUT SCIATICA: ICD-10-CM

## 2024-09-03 RX ORDER — BACLOFEN 10 MG/1
10 TABLET ORAL 3 TIMES DAILY
Qty: 30 TABLET | Refills: 0 | OUTPATIENT
Start: 2024-09-03

## 2024-09-04 RX ORDER — HYDROCHLOROTHIAZIDE 12.5 MG/1
12.5 TABLET ORAL DAILY
Qty: 30 TABLET | Refills: 2 | Status: SHIPPED | OUTPATIENT
Start: 2024-09-04

## 2024-09-10 DIAGNOSIS — G43.001 MIGRAINE WITHOUT AURA AND WITH STATUS MIGRAINOSUS, NOT INTRACTABLE: Primary | ICD-10-CM

## 2024-09-10 RX ORDER — RIZATRIPTAN BENZOATE 10 MG/1
10 TABLET ORAL ONCE AS NEEDED
Qty: 9 TABLET | Refills: 3 | Status: SHIPPED | OUTPATIENT
Start: 2024-09-10

## 2024-09-16 ENCOUNTER — OFFICE VISIT (OUTPATIENT)
Dept: FAMILY MEDICINE CLINIC | Facility: CLINIC | Age: 54
End: 2024-09-16
Payer: COMMERCIAL

## 2024-09-16 VITALS
OXYGEN SATURATION: 97 % | HEIGHT: 67 IN | TEMPERATURE: 98 F | SYSTOLIC BLOOD PRESSURE: 140 MMHG | HEART RATE: 68 BPM | WEIGHT: 125.6 LBS | BODY MASS INDEX: 19.71 KG/M2 | DIASTOLIC BLOOD PRESSURE: 90 MMHG

## 2024-09-16 DIAGNOSIS — S93.492D SPRAIN OF ANTERIOR TALOFIBULAR LIGAMENT OF LEFT ANKLE, SUBSEQUENT ENCOUNTER: Primary | ICD-10-CM

## 2024-09-16 DIAGNOSIS — K21.9 GASTROESOPHAGEAL REFLUX DISEASE, UNSPECIFIED WHETHER ESOPHAGITIS PRESENT: ICD-10-CM

## 2024-09-16 DIAGNOSIS — I10 PRIMARY HYPERTENSION: ICD-10-CM

## 2024-09-16 PROCEDURE — 99214 OFFICE O/P EST MOD 30 MIN: CPT | Performed by: NURSE PRACTITIONER

## 2024-09-16 RX ORDER — CELECOXIB 200 MG/1
200 CAPSULE ORAL DAILY
Qty: 30 CAPSULE | Refills: 0 | Status: SHIPPED | OUTPATIENT
Start: 2024-09-16

## 2024-09-17 LAB
ALBUMIN SERPL-MCNC: 5.4 G/DL (ref 3.8–4.9)
ALP SERPL-CCNC: 72 IU/L (ref 44–121)
ALT SERPL-CCNC: 46 IU/L (ref 0–44)
AST SERPL-CCNC: 29 IU/L (ref 0–40)
BASOPHILS # BLD AUTO: 0 X10E3/UL (ref 0–0.2)
BASOPHILS NFR BLD AUTO: 1 %
BILIRUB SERPL-MCNC: 0.7 MG/DL (ref 0–1.2)
BUN SERPL-MCNC: 11 MG/DL (ref 6–24)
BUN/CREAT SERPL: 10 (ref 9–20)
CALCIUM SERPL-MCNC: 10.2 MG/DL (ref 8.7–10.2)
CHLORIDE SERPL-SCNC: 97 MMOL/L (ref 96–106)
CO2 SERPL-SCNC: 22 MMOL/L (ref 20–29)
CREAT SERPL-MCNC: 1.11 MG/DL (ref 0.76–1.27)
EGFRCR SERPLBLD CKD-EPI 2021: 79 ML/MIN/1.73
EOSINOPHIL # BLD AUTO: 0.1 X10E3/UL (ref 0–0.4)
EOSINOPHIL NFR BLD AUTO: 3 %
ERYTHROCYTE [DISTWIDTH] IN BLOOD BY AUTOMATED COUNT: 14.5 % (ref 11.6–15.4)
GLOBULIN SER CALC-MCNC: 2.9 G/DL (ref 1.5–4.5)
GLUCOSE SERPL-MCNC: 78 MG/DL (ref 70–99)
HCT VFR BLD AUTO: 48.7 % (ref 37.5–51)
HGB BLD-MCNC: 15.5 G/DL (ref 13–17.7)
IMM GRANULOCYTES # BLD AUTO: 0 X10E3/UL (ref 0–0.1)
IMM GRANULOCYTES NFR BLD AUTO: 0 %
LYMPHOCYTES # BLD AUTO: 1.7 X10E3/UL (ref 0.7–3.1)
LYMPHOCYTES NFR BLD AUTO: 42 %
MCH RBC QN AUTO: 26.6 PG (ref 26.6–33)
MCHC RBC AUTO-ENTMCNC: 31.8 G/DL (ref 31.5–35.7)
MCV RBC AUTO: 84 FL (ref 79–97)
MONOCYTES # BLD AUTO: 0.3 X10E3/UL (ref 0.1–0.9)
MONOCYTES NFR BLD AUTO: 8 %
NEUTROPHILS # BLD AUTO: 1.8 X10E3/UL (ref 1.4–7)
NEUTROPHILS NFR BLD AUTO: 46 %
PLATELET # BLD AUTO: 256 X10E3/UL (ref 150–450)
POTASSIUM SERPL-SCNC: 4 MMOL/L (ref 3.5–5.2)
PROT SERPL-MCNC: 8.3 G/DL (ref 6–8.5)
RBC # BLD AUTO: 5.82 X10E6/UL (ref 4.14–5.8)
SODIUM SERPL-SCNC: 140 MMOL/L (ref 134–144)
WBC # BLD AUTO: 3.9 X10E3/UL (ref 3.4–10.8)

## 2024-09-18 PROBLEM — S93.492A SPRAIN OF ANTERIOR TALOFIBULAR LIGAMENT OF LEFT ANKLE: Status: ACTIVE | Noted: 2024-09-16

## 2024-09-18 PROBLEM — K21.9 GERD (GASTROESOPHAGEAL REFLUX DISEASE): Status: ACTIVE | Noted: 2024-09-18

## 2024-09-20 RX ORDER — ONDANSETRON 4 MG/1
4 TABLET, ORALLY DISINTEGRATING ORAL EVERY 8 HOURS PRN
Qty: 20 TABLET | Refills: 0 | Status: SHIPPED | OUTPATIENT
Start: 2024-09-20

## 2024-09-25 ENCOUNTER — TELEPHONE (OUTPATIENT)
Dept: FAMILY MEDICINE CLINIC | Facility: CLINIC | Age: 54
End: 2024-09-25

## 2024-09-27 NOTE — TELEPHONE ENCOUNTER
Caller: Skyler Huerta    Relationship to patient: Self    Best call back number: [phone] (Mobile)     Patient is needing: PATIENT CALLED TO ADVISE THAT LINE 20 IS NOT FILLED OUT CORRECTLY AND NEEDS TO FILLED OUT AND SENT BACK TO INSURANCE COMPANY.      THANKS     
I called and spoke with the patient over his paperwork and he said question 20 needed to be redone. I let him know francine had marked yes and no because as long as they stick with the restrictions he can go back to work. I asked him how it needed to be fixed and he said both boxes could be checked but they needed a date to go with when the restrictions begin and when they will end. I let him know I would let her know this and have her fill out the paperwork and then re fax it. He verbalized understanding and will call back if needed.   
Papers have been put back on Becca's desk  
Paperwork faxed   
The paper work is completed.  Please fax.  Thank you.  
PRINCIPAL PROCEDURE  Procedure: Right total knee arthroplasty  Findings and Treatment: Robot assisted

## 2024-10-11 ENCOUNTER — TRANSCRIBE ORDERS (OUTPATIENT)
Dept: ADMINISTRATIVE | Facility: HOSPITAL | Age: 54
End: 2024-10-11
Payer: COMMERCIAL

## 2024-10-11 DIAGNOSIS — M54.16 LUMBAR RADICULOPATHY: Primary | ICD-10-CM

## 2024-10-11 DIAGNOSIS — M51.34 DDD (DEGENERATIVE DISC DISEASE), THORACIC: ICD-10-CM

## 2024-10-11 DIAGNOSIS — M51.35 DDD (DEGENERATIVE DISC DISEASE), THORACOLUMBAR: ICD-10-CM

## 2024-10-11 DIAGNOSIS — M54.14 THORACIC RADICULOPATHY: ICD-10-CM

## 2024-10-12 DIAGNOSIS — S93.492D SPRAIN OF ANTERIOR TALOFIBULAR LIGAMENT OF LEFT ANKLE, SUBSEQUENT ENCOUNTER: ICD-10-CM

## 2024-10-14 RX ORDER — CELECOXIB 200 MG/1
200 CAPSULE ORAL DAILY
Qty: 30 CAPSULE | Refills: 0 | Status: SHIPPED | OUTPATIENT
Start: 2024-10-14

## 2024-10-18 ENCOUNTER — HOSPITAL ENCOUNTER (OUTPATIENT)
Dept: MRI IMAGING | Facility: HOSPITAL | Age: 54
Discharge: HOME | End: 2024-10-18
Payer: COMMERCIAL

## 2024-10-18 DIAGNOSIS — M51.34 DDD (DEGENERATIVE DISC DISEASE), THORACIC: ICD-10-CM

## 2024-10-18 DIAGNOSIS — M51.35 DDD (DEGENERATIVE DISC DISEASE), THORACOLUMBAR: ICD-10-CM

## 2024-10-18 DIAGNOSIS — M54.14 THORACIC RADICULOPATHY: ICD-10-CM

## 2024-10-18 DIAGNOSIS — M54.16 LUMBAR RADICULOPATHY: ICD-10-CM

## 2024-10-18 PROCEDURE — 72146 MRI CHEST SPINE W/O DYE: CPT

## 2024-10-18 PROCEDURE — 72148 MRI LUMBAR SPINE W/O DYE: CPT

## 2024-10-29 DIAGNOSIS — G43.001 MIGRAINE WITHOUT AURA AND WITH STATUS MIGRAINOSUS, NOT INTRACTABLE: Primary | ICD-10-CM

## 2024-10-29 RX ORDER — TOPIRAMATE 25 MG/1
25 TABLET, FILM COATED ORAL
Qty: 90 TABLET | Refills: 3 | Status: SHIPPED | OUTPATIENT
Start: 2024-10-29

## 2024-11-01 ENCOUNTER — OFFICE VISIT (OUTPATIENT)
Dept: FAMILY MEDICINE CLINIC | Facility: CLINIC | Age: 54
End: 2024-11-01
Payer: COMMERCIAL

## 2024-11-01 VITALS
HEART RATE: 86 BPM | TEMPERATURE: 97.7 F | DIASTOLIC BLOOD PRESSURE: 86 MMHG | HEIGHT: 67 IN | BODY MASS INDEX: 19.46 KG/M2 | OXYGEN SATURATION: 98 % | WEIGHT: 124 LBS | SYSTOLIC BLOOD PRESSURE: 124 MMHG

## 2024-11-01 DIAGNOSIS — R10.84 GENERALIZED ABDOMINAL PAIN: ICD-10-CM

## 2024-11-01 DIAGNOSIS — S93.492D SPRAIN OF ANTERIOR TALOFIBULAR LIGAMENT OF LEFT ANKLE, SUBSEQUENT ENCOUNTER: ICD-10-CM

## 2024-11-01 DIAGNOSIS — G43.009 MIGRAINE WITHOUT AURA AND WITHOUT STATUS MIGRAINOSUS, NOT INTRACTABLE: ICD-10-CM

## 2024-11-01 DIAGNOSIS — Z00.00 ENCOUNTER FOR ANNUAL PHYSICAL EXAM: Primary | ICD-10-CM

## 2024-11-01 DIAGNOSIS — K21.9 GASTROESOPHAGEAL REFLUX DISEASE, UNSPECIFIED WHETHER ESOPHAGITIS PRESENT: ICD-10-CM

## 2024-11-01 DIAGNOSIS — E78.5 MILD HYPERLIPIDEMIA: ICD-10-CM

## 2024-11-01 RX ORDER — AMLODIPINE BESYLATE 5 MG/1
5 TABLET ORAL DAILY
COMMUNITY

## 2024-11-01 RX ORDER — ERGOCALCIFEROL 1.25 MG/1
50000 CAPSULE, LIQUID FILLED ORAL WEEKLY
COMMUNITY

## 2024-11-01 NOTE — PATIENT INSTRUCTIONS
Continue to work on healthy diet and exercise.  Follow up in 6 months, or sooner if problems or concerns.

## 2024-11-01 NOTE — PROGRESS NOTES
Subjective   Skyler Huerta is a 54 y.o. male.     Chief Complaint   Patient presents with    Annual Exam       History of Present Illness   Patient presents for CPE with non-fasting labs.  Nutrition: pretty healthy diet  Exercise: not much exercise with recent ankle injury; planning to start walking again  Dental visits: regular visits  Vision:  Last eye exam earlier this year, wears glasses for reading  Hearing:  has chronic hearing loss in left ear with tinnitus   Immunizations will check with insurance regarding Shingrix  Colonoscopy 7/2021, to repeat in 5 years; no family history of colon cancer     F/U left ankle pain: started Celebrex daily and helped; but changed to Medrol dose pack when injured back about 1 month ago; no current NSAIDs; left ankle improving; has been doing physical therapy and helping; no longer taking Baclofen; saw spine specialty and had recent MRI after DDD noted on x-ray in ER; overall back has improved; considering epidural injection.    F/U HTN: takes Amlodipine daily; monitors BP, typically runs 120s/70-80s; some orthostasis, particularly if gets up too fast; has chronic fatigue and will have some dizziness when gets tired; no swelling other than some mild swelling in left ankle since ankle sprain.    F/U AARON: takes Omeprazole daily and works well; will have symptoms of heartburn and belching if misses a couple of doses.    F/U migraines: recently started Topamax nightly and seems to be helping; has been sleeping better; will take Rizatriptan 3 times per week as needed for migraine and helps; takes Zofran as needed and helps; sees neurology.    [facility]; [facility]    [facility]; takes fiber med for constipation and takes Imodium as needed for diarrhea and help; also takes Dicyclomine twice per week as needed for abdominal discomfort; has started seeing nutritionist to identify foods able to tolerate.    Had recent follow up with  hematology and will be having additional work up of low white blood count and red blood count.      The following portions of the patient's history were reviewed and updated as appropriate: allergies, current medications, past family history, past medical history, past social history, past surgical history and problem list.    Current Outpatient Medications on File Prior to Visit   Medication Sig    amLODIPine (NORVASC) 5 MG tablet Take 1 tablet by mouth Daily.    dicyclomine (BENTYL) 10 MG capsule Take 1 capsule by mouth 4 (Four) Times a Day Before Meals & at Bedtime.    loperamide (IMODIUM) 2 MG capsule Take 1 capsule by mouth 4 (Four) Times a Day As Needed for Diarrhea.    omeprazole (priLOSEC) 40 MG capsule Take 1 capsule by mouth Daily.    ondansetron ODT (ZOFRAN-ODT) 4 MG disintegrating tablet Place 1 tablet on the tongue Every 8 (Eight) Hours As Needed for Nausea or Vomiting (Due to migraines).    polycarbophil (calcium polycarbophil) 625 MG tablet tablet Take  by mouth Daily.    rizatriptan (MAXALT) 10 MG tablet Take 1 tablet by mouth 1 (One) Time As Needed for Migraine. May repeat in 2 hours if needed    topiramate (TOPAMAX) 25 MG tablet TAKE 1 TABLET BY MOUTH AT NIGHT    vitamin D (ERGOCALCIFEROL) 1.25 MG (65962 UT) capsule capsule Take 1 capsule by mouth 1 (One) Time Per Week.    Polyethylene Glycol 1000 powder Use. (Patient not taking: Reported on 11/1/2024)     No current facility-administered medications on file prior to visit.        Past Medical History:   Diagnosis Date    Acute frontal sinusitis     Colon polyp 2021    Removed    Diabetes mellitus 10/19/2023    Prediabetes.    Ganglion cyst of wrist, left 06/24/2020    Gastritis 2024    GERD (gastroesophageal reflux disease) 04/2024    [facility]    Headache 4/01/1990    HL (hearing loss) 1993    Left ear    Hypertension 05/2024    Irritable bowel syndrome 05/2024    [facility]    Tinnitus        Past Surgical History:   Procedure  Laterality Date    COLONOSCOPY  2021    GANGLION CYST EXCISION Left     left hand    UPPER GASTROINTESTINAL ENDOSCOPY      VASECTOMY  2005       Family History   Problem Relation Age of Onset    No Known Problems Mother     Other (Other) Father         mesothelioma    Diabetes Maternal Grandfather         Diabetic    Hypertension Maternal Grandfather     Heart disease Paternal Grandfather         Stents       Social History     Socioeconomic History    Marital status:    Tobacco Use    Smoking status: Never     Passive exposure: Past    Smokeless tobacco: Never   Vaping Use    Vaping status: Never Used   Substance and Sexual Activity    Alcohol use: Not Currently     Alcohol/week: 14.0 standard drinks of alcohol     Types: 12 Cans of beer, 2 Drinks containing 0.5 oz of alcohol per week     Comment: no current EtOH    Drug use: Yes     Frequency: 2.0 times per week     Types: Marijuana    Sexual activity: Yes     Partners: Female     Birth control/protection: Vasectomy       Review of Systems   Constitutional:  Positive for fatigue. Negative for chills, fever, unexpected weight gain and unexpected weight loss. Appetite change: has decreased appetite due to abdominal bloating when eats.  HENT:  Negative for ear pain, sore throat and trouble swallowing. Sinus pressure: some.   Eyes:  Negative for blurred vision. Eye discharge: some watery drainage.  Respiratory:  Negative for cough, chest tightness and shortness of breath. Apnea: no snoring.   Cardiovascular:  Negative for chest pain and palpitations.   Gastrointestinal:  Negative for blood in stool. Abdominal pain: some in lower abdomen, see HPI. Constipation: some at times. Diarrhea: some at times.  Endocrine: Positive for polydipsia. Negative for cold intolerance and heat intolerance.   Genitourinary:  Positive for frequency and nocturia (gets up twice per night since started Topamax, previously getting up 5-6 times per night; no change in urinary stream).  "Negative for dysuria.   Musculoskeletal:  Positive for arthralgias (joints ache--knees, elbows, shoulders, etc). Negative for back pain.   Skin:  Negative for rash and skin lesions.   Neurological:  Weakness: some at times. Headache: see HPI.   Hematological:  Does not bruise/bleed easily.   Psychiatric/Behavioral:  Negative for depressed mood. The patient is not nervous/anxious.        Objective   Vitals:    11/01/24 1323   BP: 124/86   BP Location: Left arm   Patient Position: Sitting   Cuff Size: Adult   Pulse: 86   Temp: 97.7 °F (36.5 °C)   SpO2: 98%   Weight: 56.2 kg (124 lb)   Height: 170.2 cm (67\")     Body mass index is 19.42 kg/m².    Physical Exam  Vitals and nursing note reviewed.   Constitutional:       General: He is not in acute distress.     Appearance: He is well-developed and well-groomed. He is not diaphoretic.   HENT:      Head: Normocephalic and atraumatic.      Jaw: No tenderness or pain on movement.      Right Ear: Tympanic membrane and external ear normal. No decreased hearing noted.      Left Ear: Tympanic membrane and external ear normal. No decreased hearing noted.      Nose: Nose normal.      Right Sinus: No maxillary sinus tenderness or frontal sinus tenderness.      Left Sinus: No maxillary sinus tenderness or frontal sinus tenderness.      Mouth/Throat:      Mouth: Mucous membranes are moist.      Pharynx: No oropharyngeal exudate. Posterior oropharyngeal erythema: mild.  Eyes:      Extraocular Movements: Extraocular movements intact.      Conjunctiva/sclera: Conjunctivae normal.      Pupils: Pupils are equal, round, and reactive to light.   Neck:      Thyroid: No thyromegaly.      Vascular: No carotid bruit.      Trachea: No tracheal deviation.   Cardiovascular:      Rate and Rhythm: Normal rate and regular rhythm.      Pulses: Normal pulses.      Heart sounds: Normal heart sounds. No murmur heard.  Pulmonary:      Effort: Pulmonary effort is normal. No respiratory distress.      " Breath sounds: Normal breath sounds.   Abdominal:      General: Bowel sounds are normal.      Palpations: Abdomen is soft. There is no hepatomegaly or splenomegaly.      Tenderness: There is generalized abdominal tenderness. There is no guarding or rebound.   Musculoskeletal:         General: Normal range of motion.      Cervical back: Normal range of motion and neck supple. No bony tenderness.      Thoracic back: No bony tenderness.      Lumbar back: No bony tenderness.      Right lower leg: No edema.      Left lower leg: No edema.   Lymphadenopathy:      Cervical: No cervical adenopathy.   Skin:     General: Skin is warm and dry.      Findings: No rash.   Neurological:      Mental Status: He is alert and oriented to person, place, and time.      Cranial Nerves: No cranial nerve deficit.      Motor: Motor function is intact.      Coordination: Coordination normal.      Gait: Gait normal.      Deep Tendon Reflexes: Reflexes are normal and symmetric.   Psychiatric:         Mood and Affect: Mood normal.         Behavior: Behavior normal.         Thought Content: Thought content normal.         Cognition and Memory: Cognition normal.         Judgment: Judgment normal.         Lab Results   Component Value Date    WBC 3.9 09/16/2024    RBC 5.82 (H) 09/16/2024    HGB 15.5 09/16/2024    HCT 48.7 09/16/2024    MCV 84 09/16/2024    MCH 26.6 09/16/2024    MCHC 31.8 09/16/2024    RDW 14.5 09/16/2024     09/16/2024    NEUTRORELPCT 46 09/16/2024    LYMPHORELPCT 42 09/16/2024    MONORELPCT 8 09/16/2024    EOSRELPCT 3 09/16/2024    BASORELPCT 1 09/16/2024    NEUTROABS 1.8 09/16/2024    LYMPHSABS 1.7 09/16/2024    MONOSABS 0.3 09/16/2024    EOSABS 0.1 09/16/2024    BASOSABS 0.0 09/16/2024     Lab Results   Component Value Date    GLUCOSE 78 09/16/2024    BUN 11 09/16/2024    CREATININE 1.11 09/16/2024    EGFRIFNONA 67 06/10/2020    EGFRIFAFRI 82 06/10/2020    BCR 10 09/16/2024    K 4.0 09/16/2024    CO2 22 09/16/2024     CALCIUM 10.2 09/16/2024    PROTENTOTREF 8.3 09/16/2024    ALBUMIN 5.4 (H) 09/16/2024    LABIL2 1.9 10/16/2023    AST 29 09/16/2024    ALT 46 (H) 09/16/2024      Lab Results   Component Value Date    CHLPL 201 (H) 05/15/2023    TRIG 41 05/15/2023    HDL 80 (H) 05/15/2023    VLDL 8 05/15/2023     (H) 05/15/2023     Lab Results   Component Value Date    TSH 0.715 10/16/2023     Lab Results   Component Value Date    HGBA1C 5.8 (H) 10/16/2023     Lab Results   Component Value Date    PSA 1.560 05/15/2023     Lab Results   Component Value Date    COLORU Yellow 10/06/2024    CLARITYU Clear 10/06/2024    LEUKOCYTESUR Negative 10/06/2024    GLUCOSEU Negative 10/06/2024    BLOODU Negative 10/06/2024    BILIRUBINUR Negative 10/06/2024    NITRITEU Negative 10/06/2024          Assessment    Problem List Items Addressed This Visit       Mild hyperlipidemia    Current Assessment & Plan     Continue to work on healthy diet and exercise as tolerated.         Generalized abdominal pain    Current Assessment & Plan     Continue Dicyclomine as needed.  Follow up as scheduled with GI.         Migraine without aura and without status migrainosus, not intractable    Current Assessment & Plan     Improving.  Continue Topamax nightly and Rizatriptan as needed as per neurology.         Relevant Medications    amLODIPine (NORVASC) 5 MG tablet    Sprain of anterior talofibular ligament of left ankle    Current Assessment & Plan     Resolving.         GERD (gastroesophageal reflux disease)    Current Assessment & Plan     Stable.  Continue Omeprazole daily.          Other Visit Diagnoses       Encounter for annual physical exam    -  Primary             Return in about 6 months (around 5/1/2025) for Recheck.or sooner if symptoms persist or worsen.  Impression: Health maintenance visit.  Currently, eats a pretty healthy diet and has an inadequate exercise routine.  Colorectal cancer screening: UTD.  [facility].   Screening lab work includes: pending [facility] of recent labs by [facility].  Immunizations: will check with insurance regarding Shingrix; risks and benefits of immunizations were discussed with patient.  Advice and education were given regarding nutrition and aerobic exercise.   Will [facility] and determine if any additional labs needed.

## 2024-11-02 PROBLEM — M67.432 GANGLION CYST OF WRIST, LEFT: Status: RESOLVED | Noted: 2020-06-24 | Resolved: 2024-11-02

## 2024-11-02 NOTE — ASSESSMENT & PLAN NOTE
Hypertension is stable and controlled  Continue current treatment regimen.  Regular aerobic exercise.  Ambulatory blood pressure monitoring.  Blood pressure will be reassessed in 6 months.  Continue Amlodipine daily.

## 2024-11-11 RX ORDER — HYDROCHLOROTHIAZIDE 12.5 MG/1
12.5 TABLET ORAL DAILY
Qty: 90 TABLET | Refills: 3 | OUTPATIENT
Start: 2024-11-11

## 2024-11-11 RX ORDER — ONDANSETRON 4 MG/1
TABLET, ORALLY DISINTEGRATING ORAL
Qty: 20 TABLET | Refills: 0 | Status: SHIPPED | OUTPATIENT
Start: 2024-11-11

## 2024-11-11 NOTE — TELEPHONE ENCOUNTER
LOV                  11/1/24  NOV                  (around 5/1/2025     Last refill             9/20/24  Protocol              met

## 2024-11-22 ENCOUNTER — OFFICE VISIT (OUTPATIENT)
Dept: NEUROLOGY | Facility: CLINIC | Age: 54
End: 2024-11-22
Payer: COMMERCIAL

## 2024-11-22 VITALS
DIASTOLIC BLOOD PRESSURE: 82 MMHG | HEART RATE: 68 BPM | OXYGEN SATURATION: 98 % | BODY MASS INDEX: 20.08 KG/M2 | SYSTOLIC BLOOD PRESSURE: 130 MMHG | WEIGHT: 128.2 LBS

## 2024-11-22 DIAGNOSIS — G43.001 MIGRAINE WITHOUT AURA AND WITH STATUS MIGRAINOSUS, NOT INTRACTABLE: ICD-10-CM

## 2024-11-22 PROCEDURE — 99214 OFFICE O/P EST MOD 30 MIN: CPT | Performed by: PSYCHIATRY & NEUROLOGY

## 2024-11-22 RX ORDER — TOPIRAMATE 25 MG/1
25 TABLET, FILM COATED ORAL 2 TIMES DAILY
Qty: 180 TABLET | Refills: 3 | Status: SHIPPED | OUTPATIENT
Start: 2024-11-22

## 2024-11-22 NOTE — PROGRESS NOTES
Notes by MA:  Mr Huerta is here today for a follow up for migraines.    Subjective:     Patient ID: Skyler Huerta is a 54 y.o. male.    Migraine  The following portions of the patient's history were reviewed and updated as appropriate: allergies, current medications, past family history, past medical history, past social history, past surgical history, and problem list.    Review of Systems   Neurological:  Positive for headaches.      Objective:    Neurological Exam   Awake alert pleasant cooperative oriented in all spheres with fluent speech and normal speech comprehension.    Cranial nerves II through XII normal and symmetric.    Motor exam reveals normal symmetric tone bulk and power throughout without drift or spasticity.  No adventitious movements.    The sensory exam reveals normal and symmetric sensation to light touch, temperature, vibration throughout.    Coordination testing reveals smooth and accurate finger-nose-finger bilaterally, normal heel-to-shin bilaterally and normal rhythmic rapid alternating movements.  Romberg testing is negative.    Tendon reflexes are 2+ and symmetric throughout including preserved ankle jerks bilaterally.  No ankle clonus.    Physical Exam    Assessment/Plan:     Diagnoses and all orders for this visit:    1. Migraine without aura and with status migrainosus, not intractable  -     topiramate (TOPAMAX) 25 MG tablet; Take 1 tablet by mouth 2 (Two) Times a Day.  Dispense: 180 tablet; Refill: 3     Tolerating the change over from amitriptyline to Topamax reasonably well, although he is lost a little bit of weight.  It has reduced the length of his headaches and is helping him sleep but he still having the same headache frequency and intensity.  I am titrating him to 25 mg twice daily and we talked about potential side effects and of asked him to call me if there is continued unwanted weight loss.  From an abortive standpoint he continues to do reasonably well with Maxalt 10  mg as needed which we will continue unchanged.  Will see him back in a few months to check on his progress.

## 2024-11-27 ENCOUNTER — SPECIALTY PHARMACY (OUTPATIENT)
Dept: INFUSION THERAPY | Facility: HOSPITAL | Age: 54
End: 2024-11-27
Payer: COMMERCIAL

## 2024-11-27 DIAGNOSIS — G43.001 MIGRAINE WITHOUT AURA AND WITH STATUS MIGRAINOSUS, NOT INTRACTABLE: ICD-10-CM

## 2024-11-27 RX ORDER — RIZATRIPTAN BENZOATE 10 MG/1
10 TABLET ORAL ONCE AS NEEDED
Qty: 9 TABLET | Refills: 3 | Status: SHIPPED | OUTPATIENT
Start: 2024-11-27

## 2024-12-06 ENCOUNTER — TELEPHONE (OUTPATIENT)
Dept: FAMILY MEDICINE CLINIC | Facility: CLINIC | Age: 54
End: 2024-12-06
Payer: COMMERCIAL

## 2024-12-06 NOTE — TELEPHONE ENCOUNTER
Patient brought in his FMLA reverification forms & would like to pick them up from the office once they are completed, he was last seen by Becca but Dr Church is his PCP & completed the last set of FMLA

## 2024-12-13 RX ORDER — RIZATRIPTAN BENZOATE 10 MG/1
10 TABLET ORAL ONCE AS NEEDED
Qty: 9 TABLET | Refills: 5 | Status: SHIPPED | OUTPATIENT
Start: 2024-12-13

## 2024-12-17 DIAGNOSIS — I10 PRIMARY HYPERTENSION: Primary | ICD-10-CM

## 2024-12-17 DIAGNOSIS — E78.5 MILD HYPERLIPIDEMIA: ICD-10-CM

## 2024-12-17 DIAGNOSIS — R73.03 PREDIABETES: ICD-10-CM

## 2025-01-07 DIAGNOSIS — G43.001 MIGRAINE WITHOUT AURA AND WITH STATUS MIGRAINOSUS, NOT INTRACTABLE: ICD-10-CM

## 2025-01-07 RX ORDER — TOPIRAMATE 25 MG/1
25 TABLET, FILM COATED ORAL 2 TIMES DAILY
Qty: 180 TABLET | Refills: 3 | Status: SHIPPED | OUTPATIENT
Start: 2025-01-07

## 2025-01-08 NOTE — TELEPHONE ENCOUNTER
LOV 11/1/24  NOV None  LF -historical provider    Protocol  Please advise    [facility]

## 2025-01-09 RX ORDER — DICYCLOMINE HYDROCHLORIDE 10 MG/1
10 CAPSULE ORAL
Qty: 120 CAPSULE | Refills: 0 | Status: SHIPPED | OUTPATIENT
Start: 2025-01-09

## 2025-01-20 RX ORDER — ONDANSETRON 4 MG/1
TABLET, ORALLY DISINTEGRATING ORAL
Qty: 20 TABLET | Refills: 0 | Status: SHIPPED | OUTPATIENT
Start: 2025-01-20

## 2025-01-20 NOTE — TELEPHONE ENCOUNTER
LOV                   11/1/24  NOV                   (around 5/1/2025   Last refill             11/11/24  Protocol              met

## 2025-02-21 ENCOUNTER — OFFICE VISIT (OUTPATIENT)
Dept: NEUROLOGY | Facility: CLINIC | Age: 55
End: 2025-02-21
Payer: COMMERCIAL

## 2025-02-21 ENCOUNTER — SPECIALTY PHARMACY (OUTPATIENT)
Dept: INFUSION THERAPY | Facility: HOSPITAL | Age: 55
End: 2025-02-21
Payer: COMMERCIAL

## 2025-02-21 VITALS
WEIGHT: 127 LBS | DIASTOLIC BLOOD PRESSURE: 84 MMHG | BODY MASS INDEX: 19.93 KG/M2 | HEIGHT: 67 IN | OXYGEN SATURATION: 100 % | HEART RATE: 49 BPM | SYSTOLIC BLOOD PRESSURE: 132 MMHG

## 2025-02-21 DIAGNOSIS — G43.001 MIGRAINE WITHOUT AURA AND WITH STATUS MIGRAINOSUS, NOT INTRACTABLE: Primary | ICD-10-CM

## 2025-02-21 PROCEDURE — 99214 OFFICE O/P EST MOD 30 MIN: CPT | Performed by: PSYCHIATRY & NEUROLOGY

## 2025-02-21 RX ORDER — FREMANEZUMAB-VFRM 225 MG/1.5ML
225 INJECTION SUBCUTANEOUS
Qty: 1.5 ML | Refills: 5 | Status: SHIPPED | OUTPATIENT
Start: 2025-02-21

## 2025-02-21 RX ORDER — GALCANEZUMAB 120 MG/ML
120 INJECTION, SOLUTION SUBCUTANEOUS
Qty: 1 ML | Refills: 5 | Status: SHIPPED | OUTPATIENT
Start: 2025-02-21 | End: 2025-02-21 | Stop reason: CLARIF

## 2025-02-21 NOTE — PROGRESS NOTES
Notes by LPN:  Patient presents for migraine follow up. Patient reports he has a migraine today. Feels like the topamax in the daytime makes him sleepy, so sometimes he doesn't take the morning dose, depending on what he has to do that day.          Subjective:     Patient ID: Skyler Huerta is a 54 y.o. male.    History of Present Illness  The following portions of the patient's history were reviewed and updated as appropriate: allergies, current medications, past family history, past medical history, past social history, past surgical history, and problem list.    Review of Systems   Neurological:  Positive for headaches.        Objective:    Neurological Exam   Awake alert pleasant cooperative oriented in all spheres with fluent speech and normal speech comprehension.    Cranial nerves II through XII normal and symmetric.    Motor exam reveals normal symmetric tone bulk and power throughout without drift or spasticity.  No adventitious movements.    The sensory exam reveals normal and symmetric sensation to light touch, temperature, vibration throughout.    Coordination testing reveals smooth and accurate finger-nose-finger bilaterally, normal heel-to-shin bilaterally and normal rhythmic rapid alternating movements.  Romberg testing is negative.    Tendon reflexes are 2+ and symmetric throughout including preserved ankle jerks bilaterally.  No ankle clonus.    Physical Exam    Assessment/Plan:     Diagnoses and all orders for this visit:    1. Migraine without aura and with status migrainosus, not intractable (Primary)    Other orders  -     Discontinue: galcanezumab-gnlm (Emgality) 120 MG/ML auto-injector pen; Inject 1 mL under the skin into the appropriate area as directed Every 30 (Thirty) Days.  Dispense: 1 mL; Refill: 5     Could not tolerate titrating his Topamax to only 25 mg twice daily because of sedation.  He previously failed amitriptyline.  Therefore moving to Ajovy monthly injection therapy for  prophylaxis.  Continuing to use rizatriptan as needed.  We discussed all this and answered his questions and he was given injection training today.

## 2025-02-25 ENCOUNTER — TELEPHONE (OUTPATIENT)
Dept: INFUSION THERAPY | Facility: HOSPITAL | Age: 55
End: 2025-02-25
Payer: COMMERCIAL

## 2025-02-25 NOTE — TELEPHONE ENCOUNTER
Returned call from [employer] regarding patient's prophylactic migraine regimen and spoke with Michael MARKS Rph.  Advised Michael to discontinue Emgality and proceed with Ajovy 225mg once monthly injections due to formulary requirements.   Leodan Henson RPH  02/25/25  14:22 EST

## 2025-03-04 RX ORDER — ONDANSETRON 4 MG/1
TABLET, ORALLY DISINTEGRATING ORAL
Qty: 20 TABLET | Refills: 0 | Status: SHIPPED | OUTPATIENT
Start: 2025-03-04

## 2025-05-22 DIAGNOSIS — G43.001 MIGRAINE WITHOUT AURA AND WITH STATUS MIGRAINOSUS, NOT INTRACTABLE: Primary | ICD-10-CM

## 2025-05-22 RX ORDER — RIZATRIPTAN BENZOATE 10 MG/1
10 TABLET ORAL ONCE AS NEEDED
Qty: 27 TABLET | Refills: 3 | Status: SHIPPED | OUTPATIENT
Start: 2025-05-22

## 2025-05-23 ENCOUNTER — OFFICE VISIT (OUTPATIENT)
Dept: NEUROLOGY | Facility: CLINIC | Age: 55
End: 2025-05-23
Payer: COMMERCIAL

## 2025-05-23 VITALS
DIASTOLIC BLOOD PRESSURE: 84 MMHG | SYSTOLIC BLOOD PRESSURE: 132 MMHG | OXYGEN SATURATION: 99 % | HEART RATE: 73 BPM | BODY MASS INDEX: 19.54 KG/M2 | WEIGHT: 124.8 LBS

## 2025-05-23 DIAGNOSIS — G43.001 MIGRAINE WITHOUT AURA AND WITH STATUS MIGRAINOSUS, NOT INTRACTABLE: Primary | ICD-10-CM

## 2025-05-23 PROCEDURE — 99214 OFFICE O/P EST MOD 30 MIN: CPT | Performed by: PSYCHIATRY & NEUROLOGY

## 2025-05-23 NOTE — PROGRESS NOTES
Notes by CMA:  Mr Huerta is here today for a follow up appointment pertaining to migraine. He states that the episodes are not as frequent.      Subjective:     Patient ID: Skyler Huerta is a 55 y.o. male.    Migraine    Associated symptoms: headaches      The following portions of the patient's history were reviewed and updated as appropriate: allergies, current medications, past family history, past medical history, past social history, past surgical history, and problem list.    History of Present Illness  The patient is a 55-year-old gentleman here for a follow-up on migraines.    He reports experiencing a headache today, which he plans to manage with rizatriptan and Aleve, medications that have previously proven effective. He expresses satisfaction with the current treatment regimen, which includes Ajovy administered once weekly, as it alleviates the need for daily medication intake. He has recently requested a refill of Ajovy.    INTERVAL: Since last visit 02/2025, he reports no significant changes in his condition and is generally happy with the current management plan.    MEDICATIONS  CURRENT MEDS:  Rizatriptan  Ajovy Once a week    Review of Systems   Neurological:  Positive for headaches.        Objective:    Neurological Exam   Awake alert pleasant cooperative oriented in all spheres with fluent speech and normal speech comprehension.    Cranial nerves II through XII normal and symmetric.    Motor exam reveals normal symmetric tone bulk and power throughout without drift or spasticity.  No adventitious movements.    The sensory exam reveals normal and symmetric sensation to light touch, temperature, vibration throughout.    Coordination testing reveals smooth and accurate finger-nose-finger bilaterally, normal heel-to-shin bilaterally and normal rhythmic rapid alternating movements.  Romberg testing is negative.    Tendon reflexes are 1+ and symmetric throughout including preserved ankle jerks  bilaterally.  No ankle clonus.    Physical Exam    Assessment/Plan:     There are no diagnoses linked to this encounter.     Assessment & Plan  1. Migraine.  He reports experiencing a headache today but has medication available in his car.  He has requested a refill for Ajovy, and it will be processed. He has sufficient supply of rizatriptan.  He is happy with the headache frequency reduction with Ajovy.  No problems with injection site reactions.  No changes to his current treatment plan are necessary at this time. If he needs any adjustments or experiences issues before the next scheduled visit, he should contact the office.    Follow-up  The patient will follow up in 6 months.    Patient or patient representative verbalized consent for the use of Ambient Listening during the visit with  Wali Sierra MD for chart documentation. 5/23/2025  09:31 EDT

## 2025-07-12 RX ORDER — ONDANSETRON 4 MG/1
TABLET, ORALLY DISINTEGRATING ORAL
Qty: 20 TABLET | Refills: 0 | Status: SHIPPED | OUTPATIENT
Start: 2025-07-12

## 2025-07-28 DIAGNOSIS — G43.001 MIGRAINE WITHOUT AURA AND WITH STATUS MIGRAINOSUS, NOT INTRACTABLE: Primary | ICD-10-CM

## 2025-07-28 RX ORDER — FREMANEZUMAB-VFRM 225 MG/1.5ML
INJECTION SUBCUTANEOUS
Qty: 4.5 ML | Refills: 3 | Status: SHIPPED | OUTPATIENT
Start: 2025-07-28

## 2025-08-21 ENCOUNTER — SPECIALTY PHARMACY (OUTPATIENT)
Dept: INFUSION THERAPY | Facility: HOSPITAL | Age: 55
End: 2025-08-21
Payer: COMMERCIAL